# Patient Record
Sex: FEMALE | Race: BLACK OR AFRICAN AMERICAN | NOT HISPANIC OR LATINO
[De-identification: names, ages, dates, MRNs, and addresses within clinical notes are randomized per-mention and may not be internally consistent; named-entity substitution may affect disease eponyms.]

---

## 2018-08-28 ENCOUNTER — APPOINTMENT (OUTPATIENT)
Dept: ULTRASOUND IMAGING | Facility: IMAGING CENTER | Age: 63
End: 2018-08-28
Payer: COMMERCIAL

## 2018-08-28 ENCOUNTER — OUTPATIENT (OUTPATIENT)
Dept: OUTPATIENT SERVICES | Facility: HOSPITAL | Age: 63
LOS: 1 days | End: 2018-08-28
Payer: COMMERCIAL

## 2018-08-28 DIAGNOSIS — Z17.1 ESTROGEN RECEPTOR NEGATIVE STATUS [ER-]: ICD-10-CM

## 2018-08-28 DIAGNOSIS — C50.912 MALIGNANT NEOPLASM OF UNSPECIFIED SITE OF LEFT FEMALE BREAST: ICD-10-CM

## 2018-08-28 PROCEDURE — 76641 ULTRASOUND BREAST COMPLETE: CPT | Mod: 26,50

## 2018-08-28 PROCEDURE — 76641 ULTRASOUND BREAST COMPLETE: CPT

## 2018-09-13 ENCOUNTER — APPOINTMENT (OUTPATIENT)
Dept: OPHTHALMOLOGY | Facility: CLINIC | Age: 63
End: 2018-09-13
Payer: COMMERCIAL

## 2018-09-13 DIAGNOSIS — H40.039 ANATOMICAL NARROW ANGLE, UNSPECIFIED EYE: ICD-10-CM

## 2018-09-13 PROCEDURE — 92002 INTRM OPH EXAM NEW PATIENT: CPT

## 2018-09-13 PROCEDURE — 76513 OPH US DX ANT SGM US UNI/BI: CPT

## 2018-09-13 PROCEDURE — 92020 GONIOSCOPY: CPT

## 2019-11-04 ENCOUNTER — RESULT REVIEW (OUTPATIENT)
Age: 64
End: 2019-11-04

## 2020-09-11 VITALS
TEMPERATURE: 99 F | RESPIRATION RATE: 16 BRPM | SYSTOLIC BLOOD PRESSURE: 111 MMHG | HEART RATE: 78 BPM | DIASTOLIC BLOOD PRESSURE: 58 MMHG | OXYGEN SATURATION: 99 %

## 2020-09-11 PROCEDURE — 71046 X-RAY EXAM CHEST 2 VIEWS: CPT | Mod: 26

## 2020-09-11 RX ORDER — METOCLOPRAMIDE HCL 10 MG
10 TABLET ORAL ONCE
Refills: 0 | Status: COMPLETED | OUTPATIENT
Start: 2020-09-11 | End: 2020-09-11

## 2020-09-11 RX ORDER — SODIUM CHLORIDE 9 MG/ML
2000 INJECTION INTRAMUSCULAR; INTRAVENOUS; SUBCUTANEOUS ONCE
Refills: 0 | Status: COMPLETED | OUTPATIENT
Start: 2020-09-11 | End: 2020-09-11

## 2020-09-11 RX ADMIN — SODIUM CHLORIDE 1000 MILLILITER(S): 9 INJECTION INTRAMUSCULAR; INTRAVENOUS; SUBCUTANEOUS at 23:00

## 2020-09-11 NOTE — ED ADULT TRIAGE NOTE - CHIEF COMPLAINT QUOTE
pt c/o fevers, nausea and vomiting x2-3 days. Reports took Tylenol prior to ED arrival. Denies medical hx/medications. No distress noted.

## 2020-09-11 NOTE — ED PROVIDER NOTE - CLINICAL SUMMARY MEDICAL DECISION MAKING FREE TEXT BOX
Imelda Li MD: 64yo F with PMH of breast ca s/p lumpectomy, glaucoma who presents with fever, N/V for 3 days, back pain. Pt hemodynamically stable, afebrile. Non toxic appearing. Recently treated UTI. Possible pyelonephritis. Will check labs, UA/UC, IVF, anti-emetics. No advanced imaging needed at this time as pt is nontender. Imelda Li MD: 64yo F with PMH of breast ca s/p lumpectomy, glaucoma who presents with fever, N/V for 3 days, back pain. Pt hemodynamically stable, afebrile. Non toxic appearing. Recently treated UTI. Possible pyelonephritis. Will check labs, UA/UC, IVF, anti-emetics.   Vero BERRIOS: Agree with above. In addition, plan for CT A/P to r/o intra-abdominal pathology.

## 2020-09-11 NOTE — ED PROVIDER NOTE - ATTENDING CONTRIBUTION TO CARE
Patient is a 64 yo F with hx of breast cancer s/p lumpectomy, glaucoma here for evaluation of 3 days of fever associated with nausea and vomiting. She reports a UTI in early september and completed a 7 day course of Bactrim. Symptoms began after she completed her antibiotics. Denies cough, chest pain, shortness of breath. + headache. She is c/o low back pain. No dysuria, frequency. Denies flank pain. Tmax was 102.    VS noted  Gen. no acute distress, Non toxic   Spine: No midline C-T-L spine tenderness  HEENT: EOMI, mmm, pharynx w/o erythema, no nuchal rigitiy  Lungs: CTAB/L no C/ W /R   CVS: RRR   Abd; Soft non tender, non distended, no CVA tenderness  Ext: no edema  Skin: no rash  Neuro AAOx3 non focal clear speech  a/p: fever, nausea, vomiting - differential includes gastritis, pyelonephritis, viral syndrome less likely colitis. plan for cultures, labs, Abx, CXR, CT A/P and reassess.   - Vero BERRIOS

## 2020-09-11 NOTE — ED PROVIDER NOTE - NS ED ROS FT
General: +fever, chills  HENT: denies nasal congestion, sore throat, rhinorrhea  Eyes: denies vision changes  CV: denies chest pain  Resp: denies difficulty breathing, cough  Abdominal: +nausea, +vomiting, denies diarrhea, abdominal pain, blood in stool, dark stool  : denies pain with urination  MSK: denies recent trauma  Neuro: +headaches, denies numbness, tingling, dizziness, lightheadedness.  Skin: denies new rashes  Endocrine: denies recent weight loss

## 2020-09-11 NOTE — ED PROVIDER NOTE - OBJECTIVE STATEMENT
Imelda Li MD: 64yo F with PMH of breast ca s/p lumpectomy, glaucoma who presents with fever, N/V for 3 days. Pt states that she completed a 7 day course of Bactrim for UTI 3 days ago. Was started on Flagyl for bacterial vaginosis but has not been able to complete due to N/V. Pt has at least 2 episodes of NBNB emesis everyday. Tmax 102F. Admits to posterior throbbing headache, lower back pain since UTI. Denies dysuria or urinary frequency, abd pain, vaginal discharge or bleeding. Has not been able to tolerate any PO. Last took Tylenol at 3PM. No cough, SOB, CP, weakness, numbness, tingling.    PMD: Shalonda Soriano

## 2020-09-11 NOTE — ED PROVIDER NOTE - PHYSICAL EXAMINATION
CONSTITUTIONAL: Nontoxic, well nourished, well developed, elderly female, resting comfortably in no acute distress  HEAD: Normocephalic; atraumatic  EYES: Normal inspection, EOMI  ENMT: External appears normal; normal oropharynx, dry MM  NECK: Supple; non-tender; no cervical lymphadenopathy  CARD: RRR; no audible murmurs, rubs, or gallops  RESP: No respiratory distress, lungs ctab/l  ABD: Soft, non-distended; non-tender; no rebound or guarding  EXT: No LE pitting edema or calf tenderness; distal pulses intact with good capillary refill  SKIN: Warm, dry, intact  : no CVAT  NEURO: aaox3, moving all extremities spontaneously

## 2020-09-12 ENCOUNTER — INPATIENT (INPATIENT)
Facility: HOSPITAL | Age: 65
LOS: 0 days | Discharge: HOME | End: 2020-09-13
Attending: HOSPITALIST | Admitting: HOSPITALIST
Payer: COMMERCIAL

## 2020-09-12 DIAGNOSIS — Z29.9 ENCOUNTER FOR PROPHYLACTIC MEASURES, UNSPECIFIED: ICD-10-CM

## 2020-09-12 DIAGNOSIS — R51 HEADACHE: ICD-10-CM

## 2020-09-12 DIAGNOSIS — N76.0 ACUTE VAGINITIS: ICD-10-CM

## 2020-09-12 DIAGNOSIS — Z02.9 ENCOUNTER FOR ADMINISTRATIVE EXAMINATIONS, UNSPECIFIED: ICD-10-CM

## 2020-09-12 DIAGNOSIS — D72.825 BANDEMIA: ICD-10-CM

## 2020-09-12 DIAGNOSIS — H40.9 UNSPECIFIED GLAUCOMA: ICD-10-CM

## 2020-09-12 DIAGNOSIS — R74.8 ABNORMAL LEVELS OF OTHER SERUM ENZYMES: ICD-10-CM

## 2020-09-12 DIAGNOSIS — M54.9 DORSALGIA, UNSPECIFIED: ICD-10-CM

## 2020-09-12 DIAGNOSIS — Z98.890 OTHER SPECIFIED POSTPROCEDURAL STATES: Chronic | ICD-10-CM

## 2020-09-12 DIAGNOSIS — D72.810 LYMPHOCYTOPENIA: ICD-10-CM

## 2020-09-12 DIAGNOSIS — R11.2 NAUSEA WITH VOMITING, UNSPECIFIED: ICD-10-CM

## 2020-09-12 LAB
APPEARANCE UR: CLEAR — SIGNIFICANT CHANGE UP
BACTERIA # UR AUTO: NEGATIVE — SIGNIFICANT CHANGE UP
BILIRUB UR-MCNC: NEGATIVE — SIGNIFICANT CHANGE UP
BLOOD UR QL VISUAL: SIGNIFICANT CHANGE UP
COLOR SPEC: YELLOW — SIGNIFICANT CHANGE UP
GLUCOSE UR-MCNC: NEGATIVE — SIGNIFICANT CHANGE UP
HYALINE CASTS # UR AUTO: SIGNIFICANT CHANGE UP
KETONES UR-MCNC: SIGNIFICANT CHANGE UP
LEUKOCYTE ESTERASE UR-ACNC: NEGATIVE — SIGNIFICANT CHANGE UP
MUCOUS THREADS # UR AUTO: SIGNIFICANT CHANGE UP
NITRITE UR-MCNC: NEGATIVE — SIGNIFICANT CHANGE UP
PH UR: 7 — SIGNIFICANT CHANGE UP (ref 5–8)
PROT UR-MCNC: 300 — HIGH
RBC CASTS # UR COMP ASSIST: SIGNIFICANT CHANGE UP (ref 0–?)
SARS-COV-2 RNA SPEC QL NAA+PROBE: SIGNIFICANT CHANGE UP
SP GR SPEC: > 1.04 — HIGH (ref 1–1.04)
SQUAMOUS # UR AUTO: SIGNIFICANT CHANGE UP
UROBILINOGEN FLD QL: NORMAL — SIGNIFICANT CHANGE UP
WBC UR QL: HIGH (ref 0–?)

## 2020-09-12 PROCEDURE — 99285 EMERGENCY DEPT VISIT HI MDM: CPT

## 2020-09-12 PROCEDURE — 99223 1ST HOSP IP/OBS HIGH 75: CPT

## 2020-09-12 PROCEDURE — 74177 CT ABD & PELVIS W/CONTRAST: CPT | Mod: 26

## 2020-09-12 RX ORDER — DEXTROSE MONOHYDRATE, SODIUM CHLORIDE, AND POTASSIUM CHLORIDE 50; .745; 4.5 G/1000ML; G/1000ML; G/1000ML
1000 INJECTION, SOLUTION INTRAVENOUS
Refills: 0 | Status: DISCONTINUED | OUTPATIENT
Start: 2020-09-12 | End: 2020-09-12

## 2020-09-12 RX ORDER — VANCOMYCIN HCL 1 G
1000 VIAL (EA) INTRAVENOUS ONCE
Refills: 0 | Status: COMPLETED | OUTPATIENT
Start: 2020-09-12 | End: 2020-09-12

## 2020-09-12 RX ORDER — SODIUM CHLORIDE 9 MG/ML
2000 INJECTION INTRAMUSCULAR; INTRAVENOUS; SUBCUTANEOUS ONCE
Refills: 0 | Status: COMPLETED | OUTPATIENT
Start: 2020-09-11 | End: 2020-09-11

## 2020-09-12 RX ORDER — METOCLOPRAMIDE HCL 10 MG
10 TABLET ORAL ONCE
Refills: 0 | Status: DISCONTINUED | OUTPATIENT
Start: 2020-09-11 | End: 2020-09-12

## 2020-09-12 RX ORDER — INFLUENZA VIRUS VACCINE 15; 15; 15; 15 UG/.5ML; UG/.5ML; UG/.5ML; UG/.5ML
0.5 SUSPENSION INTRAMUSCULAR ONCE
Refills: 0 | Status: DISCONTINUED | OUTPATIENT
Start: 2020-09-12 | End: 2020-09-13

## 2020-09-12 RX ORDER — PIPERACILLIN AND TAZOBACTAM 4; .5 G/20ML; G/20ML
3.38 INJECTION, POWDER, LYOPHILIZED, FOR SOLUTION INTRAVENOUS ONCE
Refills: 0 | Status: DISCONTINUED | OUTPATIENT
Start: 2020-09-12 | End: 2020-09-12

## 2020-09-12 RX ORDER — METOCLOPRAMIDE HCL 10 MG
10 TABLET ORAL ONCE
Refills: 0 | Status: DISCONTINUED | OUTPATIENT
Start: 2020-09-11 | End: 2020-09-13

## 2020-09-12 RX ORDER — VANCOMYCIN HCL 1 G
1000 VIAL (EA) INTRAVENOUS ONCE
Refills: 0 | Status: DISCONTINUED | OUTPATIENT
Start: 2020-09-12 | End: 2020-09-12

## 2020-09-12 RX ORDER — PILOCARPINE HCL 4 %
1 DROPS OPHTHALMIC (EYE)
Refills: 0 | Status: DISCONTINUED | OUTPATIENT
Start: 2020-09-12 | End: 2020-09-13

## 2020-09-12 RX ORDER — ONDANSETRON 8 MG/1
4 TABLET, FILM COATED ORAL EVERY 6 HOURS
Refills: 0 | Status: DISCONTINUED | OUTPATIENT
Start: 2020-09-12 | End: 2020-09-12

## 2020-09-12 RX ORDER — PIPERACILLIN AND TAZOBACTAM 4; .5 G/20ML; G/20ML
3.38 INJECTION, POWDER, LYOPHILIZED, FOR SOLUTION INTRAVENOUS ONCE
Refills: 0 | Status: COMPLETED | OUTPATIENT
Start: 2020-09-12 | End: 2020-09-12

## 2020-09-12 RX ADMIN — Medication 250 MILLIGRAM(S): at 03:32

## 2020-09-12 RX ADMIN — Medication 1 DROP(S): at 17:41

## 2020-09-12 RX ADMIN — Medication 1 DROP(S): at 12:59

## 2020-09-12 RX ADMIN — Medication 10 MILLIGRAM(S): at 11:15

## 2020-09-12 RX ADMIN — PIPERACILLIN AND TAZOBACTAM 200 GRAM(S): 4; .5 INJECTION, POWDER, LYOPHILIZED, FOR SOLUTION INTRAVENOUS at 03:32

## 2020-09-12 NOTE — H&P ADULT - PROBLEM SELECTOR PLAN 1
4d N/V; NBNB. No abdominal pain; clinical exam and HPI suggestive of gastroenteritis  - IV reglan PRN for N/V  - Regular diet; pt tolerating PO intake   - f/u labs; recheck cbc/cmp daily

## 2020-09-12 NOTE — H&P ADULT - ATTENDING COMMENTS
65F with PMH significant for but not limited to breast cancer s/p lumpectomy in remission who is presenting to the hospital with nausea, vomiting and fevers x 3 days. She recently completed treatment for UTI. She was also diagnosed with BV, but was never able to take medication because she started to feel ill.     PHYSICAL EXAM:  Constitutional: NAD, awake and alert  ENT:  Normal Hearing, no tonsillar exudates   Neck: Soft and supple , no thyromegaly   Respiratory: Breath sounds are clear bilaterally, No wheezing, rales or rhonchi  Cardiovascular: S1 and S2, regular rate and rhythm, no Murmurs, gallops or rubs, no JVD,    Gastrointestinal: Soft, nontender, nondistended, no guarding, no rebound. +BS  Extremities: No lower extremity edema, no calf tenderness, warm to touch  Neurological: Alert and Oriented x3.  5/5 strength b/l upper and lower extremities.   Musculoskeletal: Normal ROM, no joint swelling.  Skin: No rashes, No erythema   Psych: normal mood, normal affect    Patient has been feeling better by this afternoon. Suspect that symptoms are from gastroenteritis, but more sinister pathology possible. Will need to monitor cultures and evaluate for fevers.   Continue on IV and monitor labs. 65F with PMH significant for but not limited to breast cancer s/p lumpectomy in remission who is presenting to the hospital with nausea, vomiting and fevers x 3 days. She recently completed treatment for UTI. She was also diagnosed with BV, but was never able to take medication because she started to feel ill.     PHYSICAL EXAM:  Constitutional: NAD, awake and alert  ENT:  Normal Hearing, no tonsillar exudates   Neck: Soft and supple , no thyromegaly   Respiratory: Breath sounds are clear bilaterally, No wheezing, rales or rhonchi  Cardiovascular: S1 and S2, regular rate and rhythm, no Murmurs, gallops or rubs, no JVD,    Gastrointestinal: Soft, nontender, nondistended, no guarding, no rebound. +BS  Extremities: No lower extremity edema, no calf tenderness, warm to touch  Neurological: Alert and Oriented x3.  5/5 strength b/l upper and lower extremities.   Musculoskeletal: Normal ROM, no joint swelling.  Skin: No rashes, No erythema   Psych: normal mood, normal affect    Patient has been feeling better by this afternoon. Suspect that symptoms are from gastroenteritis, but more sinister pathology possible. Will need to monitor cultures and evaluate for fevers.   CT shows no correlating pathology but does show. Hypoattenuating lesion in the right lobe of the liver.  Continue on IVF and monitor labs.

## 2020-09-12 NOTE — H&P ADULT - ASSESSMENT
65F w/pmhx of breast cancer, glaucoma, and recent UTI p/w 4d of nausea, vomiting, headache, and fever (tmax 102F). Labs reveal lymphopenia and bandemia. Symptoms largely resolved upon arrival to floor. 65F w/pmhx of breast cancer, glaucoma, and recent UTI (s/p bactrim) p/w 4d of nausea, vomiting, headache, and fever (tmax 102F). Labs reveal lymphopenia and bandemia. Symptoms largely resolved upon arrival to floor; now tolerating PO intake

## 2020-09-12 NOTE — H&P ADULT - NSHPLABSRESULTS_GEN_ALL_CORE
T(C): 37.2 (09-11-20 @ 20:51), Max: 37.2 (09-11-20 @ 20:51)  HR: 78 (09-11-20 @ 20:51) (78 - 78)  BP: 111/58 (09-11-20 @ 20:51) (111/58 - 111/58)  RR: 16 (09-11-20 @ 20:51) (16 - 16)  SpO2: 99% (09-11-20 @ 20:51) (99% - 99%)                          13.3   3.14  )-----------( 211      ( 11 Sep 2020 22:45 )             41.4     09-11    132<L>  |  97<L>  |  19  ----------------------------<  111<H>  4.6   |  22  |  1.30    Ca    8.2<L>      11 Sep 2020 22:45    TPro  7.3  /  Alb  3.8  /  TBili  0.3  /  DBili  x   /  AST  185<H>  /  ALT  126<H>  /  AlkPhos  103  09-11    CAPILLARY BLOOD GLUCOSE          Urinalysis Basic - ( 12 Sep 2020 02:07 )    Color: YELLOW / Appearance: CLEAR / SG: > 1.040 / pH: 7.0  Gluc: NEGATIVE / Ketone: SMALL  / Bili: NEGATIVE / Urobili: NORMAL   Blood: TRACE / Protein: 300 / Nitrite: NEGATIVE   Leuk Esterase: NEGATIVE / RBC: 3-5 / WBC 6-10   Sq Epi: FEW / Non Sq Epi: x / Bacteria: NEGATIVE        metoclopramide Injectable 10 milliGRAM(s) IV Push once  sodium chloride 0.9% Bolus 2000 milliLiter(s) IV Bolus once T(C): 37.2 (09-11-20 @ 20:51), Max: 37.2 (09-11-20 @ 20:51)  HR: 78 (09-11-20 @ 20:51) (78 - 78)  BP: 111/58 (09-11-20 @ 20:51) (111/58 - 111/58)  RR: 16 (09-11-20 @ 20:51) (16 - 16)  SpO2: 99% (09-11-20 @ 20:51) (99% - 99%)                          13.3   3.14  )-----------( 211      ( 11 Sep 2020 22:45 )             41.4     09-11    132<L>  |  97<L>  |  19  ----------------------------<  111<H>  4.6   |  22  |  1.30    Ca    8.2<L>      11 Sep 2020 22:45    TPro  7.3  /  Alb  3.8  /  TBili  0.3  /  DBili  x   /  AST  185<H>  /  ALT  126<H>  /  AlkPhos  103  09-11    CAPILLARY BLOOD GLUCOSE      Urinalysis Basic - ( 12 Sep 2020 02:07 )    Color: YELLOW / Appearance: CLEAR / SG: > 1.040 / pH: 7.0  Gluc: NEGATIVE / Ketone: SMALL  / Bili: NEGATIVE / Urobili: NORMAL   Blood: TRACE / Protein: 300 / Nitrite: NEGATIVE   Leuk Esterase: NEGATIVE / RBC: 3-5 / WBC 6-10   Sq Epi: FEW / Non Sq Epi: x / Bacteria: NEGATIVE    RADIOLOGY:    CT Abdomen/Pelvis (9/12)   IMPRESSION:   1. No explanation for lower back pain on this CT. Normal appendix.   2. New 1.0 x 1.5 cm indeterminate hypoattenuating lesion in the right lobe of the liver may be further evaluated with MRI or compared with a recent cross sectional study.     CXR (9/11)  IMPRESSION:   Clear lungs. No pleural effusions or pneumothorax.   Cardiac and mediastinal silhouettes within normal limits.   Trachea midline.   Unremarkable osseous structures.

## 2020-09-12 NOTE — DOWNTIME INTERRUPTION NOTE - WHICH MANUAL FORMS INITIATED?
Food and nutrition services diet order change form  Microbiology/virology laboratory sheet  General test requisition  Radiology report  stat-prn medication record  Jose Luis pressure ulcer assessment  Vital signs and pain management flowsheet

## 2020-09-12 NOTE — H&P ADULT - HISTORY OF PRESENT ILLNESS
65F w/pmhx of stage 0 breast cancer s/p lumpectomy and RT, glaucoma, and recent UTI p/w 4d of nausea, vomiting, headache, and fever (tmax 102F). Pt completed a course of bactrim on morning of 9/8 for UTI dx by her GYN the day before onset of symptoms. Pt also reports her GYN diagnosed her w/bacterial vaginitis and prescribed PO flagyl, but she has not been able to take the medication as vomiting began the day she received the flagyl rx. On the evening of 9/8 she noticed a severe occipital HA with throbbing quality and subsequently vomited shortly after onset of HA. She denies any neck stiffness, decreased ROM of the neck, photophobia, and phonophobia associated with her HA. The HA and N/V waxed and waned over the next several days until her daughter convinced her to seek medical care on evening of 9/11 because the pt has been intolerant of PO intake since onset of symptoms. Pt had intermittent fevers (100.5-102F) along with the HA and N/V that resolved with tylenol. The vomitus initially contained undigested food and has since been watery w/acid taste and dark brown color. Also endorses some lower back pain that arose when retching. Reports 2-3 episodes daily since 9/7, but has not vomited or had a recorded fever since presentation to the ED on 9/11. Denies abdominal pain, diarrhea, constipation, hematemesis, sick contacts, changes to diet, and public gatherings. COVID PCR negative in ED.     ED Course: Afebrile, mildly hypotensive (111/58), 98.9F. Recieved 2L NS, 10mg reglan, and vanc/zosyn x1. Lymphopenia on labs w/bandemia. CT abdomen and pelvis and CXR both negative for acute pathology. Admitted to medicine HS team 2 for further workup and management. Arrived on floor with complaints of fatigue, but resolution of her fever, N/V, and headache. Pt endorses significant hunger and requests normal diet. 65F w/pmhx of stage 0 breast cancer s/p lumpectomy and RT, glaucoma, and recent UTI p/w 4d of nausea, vomiting, headache, and fever (tmax 102F). Pt completed a course of bactrim on morning of 9/8 for UTI dx by her GYN the day before onset of symptoms. Pt also reports her GYN diagnosed her w/bacterial vaginitis and prescribed PO flagyl, but she has not been able to take the medication as vomiting began the day she received the flagyl rx. On the evening of 9/8 she noticed a severe occipital HA with throbbing quality and subsequently vomited shortly after onset of HA. She denies any neck stiffness, decreased ROM of the neck, photophobia, and phonophobia associated with her HA. The HA and N/V waxed and waned over the next several days until her daughter convinced her to seek medical care on evening of 9/11 because the pt has been intolerant of PO intake since onset of symptoms. Pt had intermittent fevers (100.5-102F) along with the HA and N/V that resolved with tylenol. The vomitus initially contained undigested food and has since been watery w/acid taste and dark brown color. Reports 2-3 episodes daily since 9/7, but has not vomited or had a recorded fever since presentation to the ED on 9/11. Denies abdominal pain, diarrhea, constipation, hematemesis, sick contacts, changes to diet, and public gatherings. COVID PCR negative in ED.     ED Course: Afebrile, mildly hypotensive (111/58), 98.9F. Recieved 2L NS, 10mg reglan, and vanc/zosyn x1. Lymphopenia on labs w/bandemia. CT abdomen and pelvis and CXR both negative for acute pathology. Admitted to medicine HS team 2 for further workup and management. Arrived on floor with complaints of fatigue, but resolution of her fever, N/V, and headache. Pt endorses significant hunger and requests normal diet.

## 2020-09-12 NOTE — H&P ADULT - NSHPREVIEWOFSYSTEMS_GEN_ALL_CORE
REVIEW OF SYSTEMS    General: +fatigue, denies fevers/chills/rigors since yesterday;   Skin/Breast: +hx of BCa; denies rashes, skin breakdown, easy bruising, and ulcers  Ophthalmologic: +glaucoma; denies photophobia, active eye pain, changes to vision  ENMT: Denies ear pain, congestion, oral sores, painful/difficult swallowing, sore throat  Respiratory and Thorax: Denies chest pain, dyspnea, tachypnea  Cardiovascular: Denies chest pain, palpitations, chest pressure  Gastrointestinal: +Recent N/V; now resolved. Denies abdominal pain, constipation, diarrhea, hematemesis   Genitourinary: +Recent UTI; denies dysuria, frequency, urgency, and hematuria  Musculoskeletal: Denies muscle/joint pain, joint swelling, LE edema  Neurological: +Recent HA; now resolved. Denies photophobia, neck pain/stiffness, balance issues, weakness, loss of sensation  Psychiatric: Denies anxiety, depression  Hematology/Lymphatics: Denies spontaneous bleeding, easy bruising, clotting difficulties  Endocrine: Denies heat/cold intolerance, hair loss  Allergic/Immunologic: Denies allergies to food or medication, allergic reactions, immune compromise, and autoimmune disease REVIEW OF SYSTEMS    General: +fatigue, denies fevers/chills/rigors since yesterday;   Skin/Breast: +hx of BCa; denies rashes, skin breakdown, easy bruising, and ulcers  Ophthalmologic: +glaucoma; denies photophobia, active eye pain, changes to vision  ENMT: Denies ear pain, congestion, oral sores, painful/difficult swallowing, sore throat  Respiratory and Thorax: Denies chest pain, dyspnea, tachypnea  Cardiovascular: Denies chest pain, palpitations, chest pressure  Gastrointestinal: +Recent N/V; now resolved. Denies abdominal pain, constipation, diarrhea, hematemesis   Genitourinary: +Recent UTI; denies dysuria, frequency, urgency, and hematuria  Musculoskeletal: Denies muscle/joint pain, joint swelling, LE edema; denies back pain  Neurological: +Recent HA; now resolved. Denies photophobia, neck pain/stiffness, balance issues, weakness, loss of sensation  Psychiatric: Denies anxiety, depression  Hematology/Lymphatics: Denies spontaneous bleeding, easy bruising, clotting difficulties  Endocrine: Denies heat/cold intolerance, hair loss  Allergic/Immunologic: Denies allergies to food or medication, allergic reactions, immune compromise, and autoimmune disease

## 2020-09-12 NOTE — H&P ADULT - PROBLEM SELECTOR PLAN 6
Dx of bacterial vaginitis by OP GYN this week; rx = metronidazole; no doses taken due to N/V this week  - Hold metronidazole 9/12 pending improvement of n/v

## 2020-09-12 NOTE — H&P ADULT - NSHPPHYSICALEXAM_GEN_ALL_CORE
PHYSICAL EXAM:    Constitutional: NAD; lying comfortably in bed  Eyes: 2mm pupils that are equal and reactive to light, EOMI  ENMT: No oral lesions, full ROM of neck  Respiratory: CTAB; no crackles, rales, or ronchi  Cardiovascular: Normal rate and rhythm, no MRG, S1/S2 appreciated  Gastrointestinal: Soft, non-distended; no tenderness to palpation; +BS x4  Extremities: Spontaneously moves all extremities; no LE edema  Vascular: 2+ pulses in BL LE; 2+ BL radial pulses; no evidence of venous stasis   Neurological: Non-focal; CN II-XII grossly intact; AAOx4  Skin: No rashes or lesions  Musculoskeletal: Normal muscle mass and tone; no erythema of joints; no joint swelling  Psychiatric: Appropriate mood and congruent, reactive affect

## 2020-09-12 NOTE — H&P ADULT - PROBLEM SELECTOR PLAN 4
AST/ALT = 185/126 on admission; Incidental hypoattenuating liver lesion discovered on CT. Denies hx of hepatitis and is fully vaccinated  - Trend CMP daily while admitted  - Encourage outpatient f/u of liver lesion

## 2020-09-12 NOTE — H&P ADULT - NSHPSOCIALHISTORY_GEN_ALL_CORE
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

## 2020-09-12 NOTE — H&P ADULT - NSICDXPASTMEDICALHX_GEN_ALL_CORE_FT
PAST MEDICAL HISTORY:  Breast CA Stage 0 s/p lumpectomy and RT    Glaucoma Managed w/opthalmic pilocarpine

## 2020-09-12 NOTE — H&P ADULT - PROBLEM SELECTOR PLAN 2
Occipital, throbbing HA during course of illness; worse when lying down  - Reported as resolved since this morning  - Consider PRN motrin vs. tylenol if HA recurs

## 2020-09-13 ENCOUNTER — TRANSCRIPTION ENCOUNTER (OUTPATIENT)
Age: 65
End: 2020-09-13

## 2020-09-13 VITALS
OXYGEN SATURATION: 100 % | SYSTOLIC BLOOD PRESSURE: 97 MMHG | HEART RATE: 69 BPM | DIASTOLIC BLOOD PRESSURE: 66 MMHG | TEMPERATURE: 98 F | RESPIRATION RATE: 17 BRPM

## 2020-09-13 LAB
CULTURE RESULTS: SIGNIFICANT CHANGE UP
HCV AB S/CO SERPL IA: 0.1 S/CO — SIGNIFICANT CHANGE UP (ref 0–0.99)
HCV AB SERPL-IMP: SIGNIFICANT CHANGE UP
SPECIMEN SOURCE: SIGNIFICANT CHANGE UP

## 2020-09-13 PROCEDURE — 99239 HOSP IP/OBS DSCHRG MGMT >30: CPT

## 2020-09-13 RX ORDER — ACETAMINOPHEN 500 MG
650 TABLET ORAL EVERY 6 HOURS
Refills: 0 | Status: DISCONTINUED | OUTPATIENT
Start: 2020-09-13 | End: 2020-09-13

## 2020-09-13 RX ORDER — METRONIDAZOLE 500 MG
500 TABLET ORAL ONCE
Refills: 0 | Status: COMPLETED | OUTPATIENT
Start: 2020-09-13 | End: 2020-09-13

## 2020-09-13 RX ADMIN — Medication 1 DROP(S): at 00:52

## 2020-09-13 RX ADMIN — Medication 500 MILLIGRAM(S): at 10:03

## 2020-09-13 RX ADMIN — Medication 650 MILLIGRAM(S): at 06:15

## 2020-09-13 RX ADMIN — Medication 650 MILLIGRAM(S): at 05:43

## 2020-09-13 RX ADMIN — Medication 1 DROP(S): at 05:43

## 2020-09-13 NOTE — PROGRESS NOTE ADULT - PROBLEM SELECTOR PLAN 2
Occipital, throbbing HA during course of illness; worse when lying down  - Reported as resolved since this morning  - Consider PRN motrin vs. tylenol if HA recurs  - D/C home

## 2020-09-13 NOTE — DISCHARGE NOTE PROVIDER - NSDCCPTREATMENT_GEN_ALL_CORE_FT
PRINCIPAL PROCEDURE  Procedure: CT abdomen  Findings and Treatment: You had a CT scan of your abdomen during the evaluation of your nausea and vomiting. A CT scan produces a 3d x-ray image to allow for evalution of internal organs and other structures of the body. Your abdominal CT scan did not identify a specific cause of your symptoms, which was one of several findings that led the medical team to believe the cause of your vomiting was gastroenteritis. The CT scan did find a small lesion in your liver. The exact cause and composition of the liver lesion cannot be completely assessed by CT scans, therefore we recommend you discuss the findng with your outpatient provider for further direction on monitoring and/or management of this incidental finding.

## 2020-09-13 NOTE — DISCHARGE NOTE PROVIDER - NSDCFUADDINST_GEN_ALL_CORE_FT
Follow up with your outpatient GYN within 1 week for further instructions on your antibiotic therapy and management of your bacterial vaginitis.     Discuss the incidentally discovered liver lesion with your PCP/GYN to arrange surveillance and management.

## 2020-09-13 NOTE — DISCHARGE NOTE PROVIDER - HOSPITAL COURSE
65F w/pmhx of stage 0 breast cancer s/p lumpectomy and RT, glaucoma, and recent UTI p/w 4d of nausea, vomiting, headache, and fever (tmax 102F). Pt completed a course of bactrim on morning of 9/8 for UTI dx by her GYN the day before onset of symptoms. Pt also reports her GYN diagnosed her w/bacterial vaginitis and prescribed PO flagyl, but she has not been able to take the medication as vomiting began the day she received the flagyl rx. On the evening of 9/8 she noticed a severe occipital HA with throbbing quality and subsequently vomited shortly after onset of HA. She denies any neck stiffness, decreased ROM of the neck, photophobia, and phonophobia associated with her HA. The HA and N/V waxed and waned over the next several days until her daughter convinced her to seek medical care on evening of 9/11 because the pt has been intolerant of PO intake since onset of symptoms. Pt had intermittent fevers (100.5-102F) along with the HA and N/V that resolved with tylenol. The vomitus initially contained undigested food and has since been watery w/acid taste and dark brown color. Reports 2-3 episodes daily since 9/7, but has not vomited or had a recorded fever since presentation to the ED on 9/11. Denies abdominal pain, diarrhea, constipation, hematemesis, sick contacts, changes to diet, and public gatherings. COVID PCR negative in ED.     ED Course: Afebrile, mildly hypotensive (111/58), 98.9F. Recieved 2L NS, 10mg reglan, and vanc/zosyn x1. Lymphopenia on labs w/bandemia. CT abdomen and pelvis and CXR both negative for acute pathology. Admitted to medicine HS team 2 for further workup and management. Arrived on floor with complaints of fatigue, but resolution of her fever, N/V, and headache. Pt endorses significant hunger and requests normal diet.      Admitted on 9/12; no episodes of vomiting and nausea resolved by time she arrived on the floor. Requested and supplied normal diet and has tolerated PO intake since. BCx negative to date; UCx grew normal kyra. CT negative for acute pathology; incidental finding of hypoattenuating liver lesion noted and should be followed as outpatient. Afebrile since ED arrival. Feels well and ready for d/c. Given flagyl PO x1 on day of d/c to replace dose she saw in her vomitus prior to arrival.

## 2020-09-13 NOTE — PROGRESS NOTE ADULT - PROBLEM SELECTOR PLAN 4
AST/ALT = 185/126 on admission; Incidental hypoattenuating liver lesion discovered on CT. Denies hx of hepatitis and is fully vaccinated  - Trend CMP daily while admitted  - Encourage outpatient f/u of liver lesion; discussed w/patient w/successful teachback

## 2020-09-13 NOTE — PROGRESS NOTE ADULT - PROBLEM SELECTOR PLAN 8
Dispo:   1.  Name of PCP: Ne Key (MD - Internal Medicine)  2.  PCP Contacted on Admission: [ ] Y    [X] N  Admitted weekend night  3.  PCP contacted at Discharge: [ ] Y    [X] N    [ ] N/A Discharged weekend  4.  Post-Discharge Appointment Date and Location: Yes  5.  Summary of Handoff given to PCP: No; discharged on weekend

## 2020-09-13 NOTE — DISCHARGE NOTE PROVIDER - NSDCCPCAREPLAN_GEN_ALL_CORE_FT
PRINCIPAL DISCHARGE DIAGNOSIS  Diagnosis: Gastroenteritis  Assessment and Plan of Treatment: Gastroenteritis is an infection in the gut that can cause any combination of nausea, vomiting, diarrhea, fever, and elevated white blood cell count. The description of your vomiting and fevers at home with rapid resolution in the hospital were consistent with a diagnosis of gastroenteritis; likely due to consumption of contaminated food products. You should continue to eat as tolerated and make sure to wash raw foods and salads prior to consumption. Avoid eating foods that have not been appropriately refrigerated. Cook meats thouroughly.   RETURN TO THE EMERGENCY DEPARTMENT IF: You have recurring nausea and/or vomiting, fevers, significant abdominal pain, altered mental status, loss of consciousness, or you are unable to tolerate oral intake.      SECONDARY DISCHARGE DIAGNOSES  Diagnosis: Headache  Assessment and Plan of Treatment: Headaches are a common medical problem that can arise for a variety of reasons. The description and timing of your headache in the setting of decreased oral intake, vomiting, and fever make the most likely cause a combination of dehydration and electrolyte imbalances. Your headaches continue to be an issue, but have largely improved since your admission and the administration of IV fluids.   RETURN TO THE EMERGENCY DEPARTMENT IF: You have recurring headaches that do not respond to over the counter medications such as tylenol or ibuprofen. You have significantly increased severity in the headaches, or you have headaches that coincide with fever, result in vomiting, or result in visual changes.

## 2020-09-13 NOTE — PROGRESS NOTE ADULT - PROBLEM SELECTOR PLAN 6
Dx of bacterial vaginitis by OP GYN this week; rx = metronidazole; no doses taken due to N/V this week  - Tolerated PO metronidazole 9/13; c/w abx following DC

## 2020-09-13 NOTE — DISCHARGE NOTE PROVIDER - PROVIDER TOKENS
PROVIDER:[TOKEN:[[id number]],ESTABLISHEDPATIENT:[T]] PROVIDER:[TOKEN:[[id number]],SCHEDULEDAPPT:[09/14/2020],ESTABLISHEDPATIENT:[T]]

## 2020-09-13 NOTE — DISCHARGE NOTE PROVIDER - NSDCMRMEDTOKEN_GEN_ALL_CORE_FT
metroNIDAZOLE 500 mg oral tablet: 1 tab(s) orally 2 times a day  pilocarpine 2% ophthalmic solution: 1 drop(s) to each affected eye 4 times a day

## 2020-09-13 NOTE — PROGRESS NOTE ADULT - PROBLEM SELECTOR PLAN 1
4d N/V; NBNB. No abdominal pain; clinical exam and HPI suggestive of gastroenteritis  - IV reglan PRN for N/V  - Regular diet; pt tolerating PO intake   - D/C home

## 2020-09-13 NOTE — DISCHARGE NOTE NURSING/CASE MANAGEMENT/SOCIAL WORK - PATIENT PORTAL LINK FT
You can access the FollowMyHealth Patient Portal offered by [facility] by registering at the following website: [email or web address]. By joining [facility]’s FollowMyHealth portal, you will also be able to view your health information using other applications (apps) compatible with our system.

## 2020-09-13 NOTE — PROGRESS NOTE ADULT - SUBJECTIVE AND OBJECTIVE BOX
PROGRESS NOTE:     CONTACT INFO:  Bryant Bates MD (Resident Physician - PGY-1 - Internal Medicine)  [email or web address]  Pager: [phone] (any site) or [phone] ([facility] only)    Patient is a 65y old  Female who presents with a chief complaint of Vomiting; fever (12 Sep 2020 10:20)      SUBJECTIVE / OVERNIGHT EVENTS:  No acute events overnight. Patient seen and evaluated at bedside. No fever/chills.  Denies SOB at rest, chest pain, palpitations, abdominal pain, nausea/vomiting    ADDITIONAL REVIEW OF SYSTEMS:    MEDICATIONS  (STANDING):  influenza   Vaccine 0.5 milliLiter(s) IntraMuscular once  metoclopramide Injectable 10 milliGRAM(s) IV Push once  metroNIDAZOLE    Tablet 500 milliGRAM(s) Oral once  pilocarpine 2% Solution 1 Drop(s) Both EYES four times a day    MEDICATIONS  (PRN):  acetaminophen   Tablet .. 650 milliGRAM(s) Oral every 6 hours PRN Mild Pain (1 - 3), Moderate Pain (4 - 6), Severe Pain (7 - 10)      CAPILLARY BLOOD GLUCOSE        I&O's Summary      PHYSICAL EXAM:  Vital Signs Last 24 Hrs  T(C): 36.9 (13 Sep 2020 05:38), Max: 37.1 (12 Sep 2020 21:56)  T(F): 98.4 (13 Sep 2020 05:38), Max: 98.7 (12 Sep 2020 21:56)  HR: 69 (13 Sep 2020 05:38) (69 - 74)  BP: 97/66 (13 Sep 2020 05:38) (97/66 - 102/66)  BP(mean): --  RR: 17 (13 Sep 2020 05:38) (17 - 17)  SpO2: 100% (13 Sep 2020 05:38) (100% - 100%)    CONSTITUTIONAL: NAD, well-developed  RESPIRATORY: Normal respiratory effort; lungs are clear to auscultation bilaterally  CARDIOVASCULAR: Regular rate and rhythm, normal S1 and S2, no murmur/rub/gallop; No lower extremity edema; Peripheral pulses are 2+ bilaterally  ABDOMEN: Nontender to palpation, normoactive bowel sounds, no rebound/guarding; No hepatosplenomegaly  MUSCLOSKELETAL: no clubbing or cyanosis of digits; no joint swelling or tenderness to palpation  PSYCH: A+O to person, place, and time; affect appropriate    LABS:                        13.3   3.14  )-----------( 211      ( 11 Sep 2020 22:45 )             41.4     09-11    132<L>  |  97<L>  |  19  ----------------------------<  111<H>  4.6   |  22  |  1.30    Ca    8.2<L>      11 Sep 2020 22:45    TPro  7.3  /  Alb  3.8  /  TBili  0.3  /  DBili  x   /  AST  185<H>  /  ALT  126<H>  /  AlkPhos  103  09-11          Urinalysis Basic - ( 12 Sep 2020 02:07 )    Color: YELLOW / Appearance: CLEAR / SG: > 1.040 / pH: 7.0  Gluc: NEGATIVE / Ketone: SMALL  / Bili: NEGATIVE / Urobili: NORMAL   Blood: TRACE / Protein: 300 / Nitrite: NEGATIVE   Leuk Esterase: NEGATIVE / RBC: 3-5 / WBC 6-10   Sq Epi: FEW / Non Sq Epi: x / Bacteria: NEGATIVE        Culture - Blood (collected 12 Sep 2020 06:58)  Source: .Blood Blood-Venous  Preliminary Report (13 Sep 2020 07:01):    No growth to date.    Culture - Blood (collected 12 Sep 2020 06:58)  Source: .Blood Blood-Peripheral  Preliminary Report (13 Sep 2020 07:01):    No growth to date.    Culture - Urine (collected 12 Sep 2020 02:21)  Source: .Urine Clean Catch (Midstream)  Final Report (13 Sep 2020 07:36):    <10,000 CFU/mL Normal Urogenital Ximena        RADIOLOGY & ADDITIONAL TESTS:  Results Reviewed:   Imaging Personally Reviewed:  Electrocardiogram Personally Reviewed:    COORDINATION OF CARE:  Care Discussed with Consultants/Other Providers [Y/N]:  Prior or Outpatient Records Reviewed [Y/N]:   PROGRESS NOTE:     CONTACT INFO:  Bryant Bates MD (Resident Physician - PGY-1 - Internal Medicine)  [email or web address]  Pager: [phone] (any site) or [phone] ([facility] only)    Patient is a 65y old  Female who presents with a chief complaint of Vomiting; fever (12 Sep 2020 10:20)      SUBJECTIVE / OVERNIGHT EVENTS:  Minor headache overnight that improved w/tylenol. HA reported as due to lost sleep from hearing roommate's IV alarm going off repeatedly. Patient seen and evaluated at bedside. No fever/chills. Denies SOB at rest, chest pain, palpitations, abdominal pain, nausea/vomiting    ADDITIONAL REVIEW OF SYSTEMS:    MEDICATIONS  (STANDING):  influenza   Vaccine 0.5 milliLiter(s) IntraMuscular once  metoclopramide Injectable 10 milliGRAM(s) IV Push once  metroNIDAZOLE    Tablet 500 milliGRAM(s) Oral once  pilocarpine 2% Solution 1 Drop(s) Both EYES four times a day    MEDICATIONS  (PRN):  acetaminophen   Tablet .. 650 milliGRAM(s) Oral every 6 hours PRN Mild Pain (1 - 3), Moderate Pain (4 - 6), Severe Pain (7 - 10)      CAPILLARY BLOOD GLUCOSE        I&O's Summary      PHYSICAL EXAM:  Vital Signs Last 24 Hrs  T(C): 36.9 (13 Sep 2020 05:38), Max: 37.1 (12 Sep 2020 21:56)  T(F): 98.4 (13 Sep 2020 05:38), Max: 98.7 (12 Sep 2020 21:56)  HR: 69 (13 Sep 2020 05:38) (69 - 74)  BP: 97/66 (13 Sep 2020 05:38) (97/66 - 102/66)  BP(mean): --  RR: 17 (13 Sep 2020 05:38) (17 - 17)  SpO2: 100% (13 Sep 2020 05:38) (100% - 100%)    Constitutional: NAD; lying comfortably in bed  Eyes: 2mm pupils that are equal and reactive to light, EOMI  Respiratory: CTAB; no crackles, rales, or ronchi  Cardiovascular: Normal rate and rhythm, no MRG, S1/S2 appreciated  Gastrointestinal: Soft, non-distended; no tenderness to palpation; +BS x4  Extremities: Spontaneously moves all extremities; no LE edema  Neurological: Non-focal; AAOx4  Psychiatric: Appropriate mood; congruent and reactive affect    LABS:                        13.3   3.14  )-----------( 211      ( 11 Sep 2020 22:45 )             41.4     09-11    132<L>  |  97<L>  |  19  ----------------------------<  111<H>  4.6   |  22  |  1.30    Ca    8.2<L>      11 Sep 2020 22:45    TPro  7.3  /  Alb  3.8  /  TBili  0.3  /  DBili  x   /  AST  185<H>  /  ALT  126<H>  /  AlkPhos  103  09-11          Urinalysis Basic - ( 12 Sep 2020 02:07 )    Color: YELLOW / Appearance: CLEAR / SG: > 1.040 / pH: 7.0  Gluc: NEGATIVE / Ketone: SMALL  / Bili: NEGATIVE / Urobili: NORMAL   Blood: TRACE / Protein: 300 / Nitrite: NEGATIVE   Leuk Esterase: NEGATIVE / RBC: 3-5 / WBC 6-10   Sq Epi: FEW / Non Sq Epi: x / Bacteria: NEGATIVE        Culture - Blood (collected 12 Sep 2020 06:58)  Source: .Blood Blood-Venous  Preliminary Report (13 Sep 2020 07:01):    No growth to date.    Culture - Blood (collected 12 Sep 2020 06:58)  Source: .Blood Blood-Peripheral  Preliminary Report (13 Sep 2020 07:01):    No growth to date.    Culture - Urine (collected 12 Sep 2020 02:21)  Source: .Urine Clean Catch (Midstream)  Final Report (13 Sep 2020 07:36):    <10,000 CFU/mL Normal Urogenital Ximena        RADIOLOGY & ADDITIONAL TESTS:  Results Reviewed: Y  Imaging Personally Reviewed: Y  Electrocardiogram Personally Reviewed:    CT Abdomen/Pelvis - 9/12  1. No explanation for lower back pain on this CT. Normal appendix.   2. New 1.0 x 1.5 cm indeterminate hypoattenuating lesion in the right lobe of the liver may be further evaluated with MRI or compared with a recent cross sectional study.       COORDINATION OF CARE:  Care Discussed with Consultants/Other Providers [Y/N]: Y  Prior or Outpatient Records Reviewed [Y/N]: Y

## 2020-09-13 NOTE — PROGRESS NOTE ADULT - ATTENDING COMMENTS
65F with PMH significant for but not limited to breast cancer s/p lumpectomy in remission who is presenting to the hospital with nausea, vomiting and fevers x 3 days. She recently completed treatment for UTI. She was also diagnosed with BV, but was never able to take medication because she started to feel ill.     Patient is feeling much improved today. Suspect that symptoms are from gastroenteritis. Cultures negative x2day, will need to be followed to completion.    CT shows no correlating pathology but does show hypoattenuating lesion in the right lobe of the liver. Patient was informed and will bring this up with her PCP who she will see tomorrow.

## 2020-09-13 NOTE — PROGRESS NOTE ADULT - PROBLEM SELECTOR PLAN 7
- No DVT prophylaxis at this time as pt is ambulatory and has no history of blood clots or hypercoagulable disorder   - Out of Bed as Tolerated  - Flu shot

## 2020-09-13 NOTE — DISCHARGE NOTE PROVIDER - CARE PROVIDER_API CALL
Shalonda Key  INTERNAL MEDICINE  [address] [place] [address]  Phone: [phone]  Fax: [phone]  Established Patient  Follow Up Time:    Shalonda Key  INTERNAL MEDICINE  [address] [place] [address]  Phone: [phone]  Fax: [phone]  Established Patient  Scheduled Appointment: 09/14/2020

## 2020-09-13 NOTE — DISCHARGE NOTE PROVIDER - INSTRUCTIONS
Continue to eat a balanced diet as tolerated. Take care to wash raw food products and cook all meats thoroughly prior to consumption.    Avoid alcohol during your course of outpatient antibiotics, as alcohol consumption while taking metronidazole can decrease the effectiveness of the antibiotic and result in unexpected levels of intoxication for the amount of alcohol consumed.

## 2020-09-13 NOTE — PROGRESS NOTE ADULT - ASSESSMENT
65F w/pmhx of breast cancer, glaucoma, and recent UTI (s/p bactrim) p/w 4d of nausea, vomiting, headache, and fever (tmax 102F). Labs reveal lymphopenia and bandemia. Symptoms largely resolved upon arrival to floor; now tolerating PO intake. Tolerated AM dose of flagyl (outpatient med for dx of bacterial vaginitis). Pt is clear for discharge today. 11-Mar-2017

## 2020-09-17 LAB
CULTURE RESULTS: SIGNIFICANT CHANGE UP
CULTURE RESULTS: SIGNIFICANT CHANGE UP
SPECIMEN SOURCE: SIGNIFICANT CHANGE UP
SPECIMEN SOURCE: SIGNIFICANT CHANGE UP

## 2022-05-10 NOTE — PATIENT PROFILE ADULT - NSPROEDALEARNPREFOTH_GEN_A_NUR
Lorazepam Approved      Filling Pharmacy: [id number] [place], [place]          verbal instruction

## 2022-05-20 ENCOUNTER — INPATIENT (INPATIENT)
Facility: HOSPITAL | Age: 67
LOS: 0 days | Discharge: HOME | DRG: 866 | End: 2022-05-21
Attending: HOSPITALIST | Admitting: HOSPITALIST
Payer: COMMERCIAL

## 2022-05-20 VITALS
OXYGEN SATURATION: 100 % | HEART RATE: 97 BPM | WEIGHT: 138.89 LBS | TEMPERATURE: 98 F | RESPIRATION RATE: 19 BRPM | HEIGHT: 63 IN | SYSTOLIC BLOOD PRESSURE: 122 MMHG | DIASTOLIC BLOOD PRESSURE: 86 MMHG

## 2022-05-20 DIAGNOSIS — Z98.890 OTHER SPECIFIED POSTPROCEDURAL STATES: Chronic | ICD-10-CM

## 2022-05-20 DIAGNOSIS — R51.9 HEADACHE, UNSPECIFIED: ICD-10-CM

## 2022-05-20 LAB
ALBUMIN SERPL ELPH-MCNC: 3.9 G/DL — SIGNIFICANT CHANGE UP (ref 3.3–5)
ALP SERPL-CCNC: 69 U/L — SIGNIFICANT CHANGE UP (ref 40–120)
ALT FLD-CCNC: 20 U/L — SIGNIFICANT CHANGE UP (ref 10–45)
ANION GAP SERPL CALC-SCNC: 11 MMOL/L — SIGNIFICANT CHANGE UP (ref 5–17)
AST SERPL-CCNC: 25 U/L — SIGNIFICANT CHANGE UP (ref 10–40)
BASOPHILS # BLD AUTO: 0.02 K/UL — SIGNIFICANT CHANGE UP (ref 0–0.2)
BASOPHILS NFR BLD AUTO: 0.4 % — SIGNIFICANT CHANGE UP (ref 0–2)
BILIRUB SERPL-MCNC: 0.2 MG/DL — SIGNIFICANT CHANGE UP (ref 0.2–1.2)
BUN SERPL-MCNC: 9 MG/DL — SIGNIFICANT CHANGE UP (ref 7–23)
CALCIUM SERPL-MCNC: 8.9 MG/DL — SIGNIFICANT CHANGE UP (ref 8.4–10.5)
CHLORIDE SERPL-SCNC: 103 MMOL/L — SIGNIFICANT CHANGE UP (ref 96–108)
CO2 SERPL-SCNC: 26 MMOL/L — SIGNIFICANT CHANGE UP (ref 22–31)
CREAT SERPL-MCNC: 0.96 MG/DL — SIGNIFICANT CHANGE UP (ref 0.5–1.3)
CRP SERPL-MCNC: 11 MG/L — HIGH (ref 0–4)
EGFR: 66 ML/MIN/1.73M2 — SIGNIFICANT CHANGE UP
EOSINOPHIL # BLD AUTO: 0 K/UL — SIGNIFICANT CHANGE UP (ref 0–0.5)
EOSINOPHIL NFR BLD AUTO: 0 % — SIGNIFICANT CHANGE UP (ref 0–6)
ERYTHROCYTE [SEDIMENTATION RATE] IN BLOOD: 34 MM/HR — HIGH (ref 0–20)
FLUAV H1 2009 PAND RNA SPEC QL NAA+PROBE: DETECTED
GLUCOSE SERPL-MCNC: 98 MG/DL — SIGNIFICANT CHANGE UP (ref 70–99)
HCT VFR BLD CALC: 38.9 % — SIGNIFICANT CHANGE UP (ref 34.5–45)
HGB BLD-MCNC: 12.4 G/DL — SIGNIFICANT CHANGE UP (ref 11.5–15.5)
IMM GRANULOCYTES NFR BLD AUTO: 0.2 % — SIGNIFICANT CHANGE UP (ref 0–1.5)
LYMPHOCYTES # BLD AUTO: 1.33 K/UL — SIGNIFICANT CHANGE UP (ref 1–3.3)
LYMPHOCYTES # BLD AUTO: 28.3 % — SIGNIFICANT CHANGE UP (ref 13–44)
MCHC RBC-ENTMCNC: 28.6 PG — SIGNIFICANT CHANGE UP (ref 27–34)
MCHC RBC-ENTMCNC: 31.9 GM/DL — LOW (ref 32–36)
MCV RBC AUTO: 89.8 FL — SIGNIFICANT CHANGE UP (ref 80–100)
MONOCYTES # BLD AUTO: 0.38 K/UL — SIGNIFICANT CHANGE UP (ref 0–0.9)
MONOCYTES NFR BLD AUTO: 8.1 % — SIGNIFICANT CHANGE UP (ref 2–14)
NEUTROPHILS # BLD AUTO: 2.96 K/UL — SIGNIFICANT CHANGE UP (ref 1.8–7.4)
NEUTROPHILS NFR BLD AUTO: 63 % — SIGNIFICANT CHANGE UP (ref 43–77)
NRBC # BLD: 0 /100 WBCS — SIGNIFICANT CHANGE UP (ref 0–0)
PLATELET # BLD AUTO: 202 K/UL — SIGNIFICANT CHANGE UP (ref 150–400)
POTASSIUM SERPL-MCNC: 3.4 MMOL/L — LOW (ref 3.5–5.3)
POTASSIUM SERPL-SCNC: 3.4 MMOL/L — LOW (ref 3.5–5.3)
PROT SERPL-MCNC: 7.2 G/DL — SIGNIFICANT CHANGE UP (ref 6–8.3)
RAPID RVP RESULT: DETECTED
RBC # BLD: 4.33 M/UL — SIGNIFICANT CHANGE UP (ref 3.8–5.2)
RBC # FLD: 13 % — SIGNIFICANT CHANGE UP (ref 10.3–14.5)
SARS-COV-2 RNA SPEC QL NAA+PROBE: SIGNIFICANT CHANGE UP
SODIUM SERPL-SCNC: 140 MMOL/L — SIGNIFICANT CHANGE UP (ref 135–145)
WBC # BLD: 4.7 K/UL — SIGNIFICANT CHANGE UP (ref 3.8–10.5)
WBC # FLD AUTO: 4.7 K/UL — SIGNIFICANT CHANGE UP (ref 3.8–10.5)

## 2022-05-20 PROCEDURE — 99223 1ST HOSP IP/OBS HIGH 75: CPT

## 2022-05-20 PROCEDURE — 99284 EMERGENCY DEPT VISIT MOD MDM: CPT

## 2022-05-20 RX ORDER — LANOLIN ALCOHOL/MO/W.PET/CERES
3 CREAM (GRAM) TOPICAL AT BEDTIME
Refills: 0 | Status: DISCONTINUED | OUTPATIENT
Start: 2022-05-20 | End: 2022-05-21

## 2022-05-20 RX ORDER — ACETAMINOPHEN 500 MG
650 TABLET ORAL EVERY 6 HOURS
Refills: 0 | Status: DISCONTINUED | OUTPATIENT
Start: 2022-05-20 | End: 2022-05-21

## 2022-05-20 RX ORDER — POTASSIUM CHLORIDE 20 MEQ
40 PACKET (EA) ORAL ONCE
Refills: 0 | Status: COMPLETED | OUTPATIENT
Start: 2022-05-20 | End: 2022-05-20

## 2022-05-20 RX ORDER — ONDANSETRON 8 MG/1
4 TABLET, FILM COATED ORAL EVERY 8 HOURS
Refills: 0 | Status: DISCONTINUED | OUTPATIENT
Start: 2022-05-20 | End: 2022-05-21

## 2022-05-20 NOTE — H&P ADULT - HISTORY OF PRESENT ILLNESS
66F PMH Breast Ca. in remission, p/w 2-day h/o  L. temporal pain. Describes pain as . She also reports URI sxs including, nasal/sinus congestion, post-nasal drip, and sneezing. Reports noticing bulging blood vessel in L. temporal area that pulsated. She took tylenol with pain relief. Pulsation recurred and pt decided to come to ED for evaluation.      ED course: Afebrile. HDS. Flu+   66F PMH  asx Sarcoidosis, Breast Ca. in remission, p/w 2-day h/o HA and temporal vessel bulge. She reports 2-day h/o a throbbing posteriorly located HA that started when she awoke from sleep and had noticed a pulsating, bulging blood vessel in her left temple. She took some Tylenol with relief of the HA but was concerned when she realized the bulging vessel was still there and decided to come in for evaluation. Of note, she has also had nasal/sinus congestion with a postnasal drip. Denied fevers/chill, temple tenderness, jaw claudication, vision changes, eye tearing, photophobia, n/v, neck stiffness.      ED course: Afebrile. HDS. Flu+

## 2022-05-20 NOTE — H&P ADULT - NSICDXFAMILYHX_GEN_ALL_CORE_FT
FAMILY HISTORY:  Mother  Still living? Unknown  Family history of Graves' disease, Age at diagnosis: Age Unknown

## 2022-05-20 NOTE — H&P ADULT - PROBLEM SELECTOR PLAN 1
-suspect related to flu and less likely temporal arteritis; exam with non-tender L. temple. no acute vision changes per pt  -Tylenol prn  -ESR and CRP mildly elevated(31 and   -will order US with color dopplers of head to evaluate for temporal arteritis  -will hold off on steroids for now  -can consider neuro eval in AM -suspect related to flu and less likely temporal arteritis; exam with non-tender L. temple. no acute vision changes per pt  -Tylenol prn  -ESR and CRP mildly elevated(31 and 11)  -will order US with color dopplers of head to evaluate for temporal arteritis  -will hold off on steroids for now however if c/o acute visual changes /temporal tenderness, please order IV steroids, page optho if acute vision changes  -can consider rheum eval in AM given background of sarcoidosis

## 2022-05-20 NOTE — ED PROVIDER NOTE - ATTENDING APP SHARED VISIT CONTRIBUTION OF CARE
Nemes - 67yo F hx of Breast CA in remission, glaucoma, HTN, presents to ER for left temporal pain x2days and headache.  Patient reports began having nasal congestion, sneezing, post nasal drip, sinus pressure.  Noticed bulging/pulsating and tender vessel to left temporal area, reports began feeling pulsation to area.  Took Tylenol and headache resolved.  However again the began feeling pulsation to area, which was again relieved by Tylenol.  patient originally thought bulging vessels would resolved but they have not prompting her to come to ER.  Denies fever, chills, CP, SOB, cough, visual changes, blurry vision, dizziness, throat pain, extremity numbness/tingling/weakness.  VS wnl, well appearing, in NAD. Moist mucosae, pink conjunctivae. Bulging/pulsating L temporal artery, TTP. Neck supple, FROM, CN/neuro intact. Lungs clear, cardiac wnl, no JVD. Abdomen soft/NT, no CVAT. No pedal edema, no calf TTP.   Likely temporal arteritis. Will get basic labs, CRP/ESR. If positive will get Vascular Sx/Neuro consult

## 2022-05-20 NOTE — ED PROVIDER NOTE - NS ED ATTENDING STATEMENT MOD
This was a shared visit with the BRAYAN. I reviewed and verified the documentation and independently performed the documented:

## 2022-05-20 NOTE — H&P ADULT - NSICDXPASTMEDICALHX_GEN_ALL_CORE_FT
PAST MEDICAL HISTORY:  Breast CA Stage 0 s/p lumpectomy and RT    History of narrow angle glaucoma     Sarcoidosis

## 2022-05-20 NOTE — H&P ADULT - PROBLEM SELECTOR PLAN 2
-pt with very mild sxs and just outside of window for tamiflu benefit  -will order flonase for nasal/sinus congestion

## 2022-05-20 NOTE — ED PROVIDER NOTE - CPE EDP EYES NORM
--IRON TIBC--- Message from Yovanny Swanson MD sent at 11/13/2020  7:16 AM CST -----  Results normal for this patient.  She can stop iron supplement at this point, repeat CBC IRON TIBC three months.       normal...

## 2022-05-20 NOTE — H&P ADULT - NSHPREVIEWOFSYSTEMS_GEN_ALL_CORE
GEN: no night sweats or change in appetite  EYES: no changes in vision or diplopia   ENT: no epistaxis, sinus pain, gingival bleeding,+post-nasal drip  CV: no CP, PND or palpitations  RESP: no cough, wheezing, or hemoptysis  GI: no hematemesis, hematochezia, or melena  : no dysuria, polyuria, or hematuria  MSK: no arthralgias or joint swelling   NEURO: no gross sensory changes, numbness, focal deficits  PSYCH: no depression or changes in concentration  HEME/ONC: no purpura, petechiae or night sweats  SKIN: no pruritus, hair loss or skin lesions  ALL: no photosensitivity, no complaints of anaphylaxis (SOB, throat swelling)

## 2022-05-20 NOTE — ED ADULT NURSE NOTE - OBJECTIVE STATEMENT
Patient presents to ED for diffuse headache since Wednesday, patient states earlier in the day she felt congested then felt the HA coming on. HA is controlled by Tylenol however patient came to ED for concern of "bump pulsating" on left temporal area that appeared with the headache. Denies any vision changes, b/l pupils 3mm equal and reactive. At present headache has subsided, denies dizziness, denies numbness/tingling to face. Denies n/v. Denies fevers/chills. Last dose of Tylenol taken last night.

## 2022-05-20 NOTE — H&P ADULT - NSHPLABSRESULTS_GEN_ALL_CORE
12.4   4.70  )-----------( 202      ( 20 May 2022 19:47 )             38.9       05-20    140  |  103  |  9   ----------------------------<  98  3.4<L>   |  26  |  0.96    Ca    8.9      20 May 2022 19:47    TPro  7.2  /  Alb  3.9  /  TBili  0.2  /  DBili  x   /  AST  25  /  ALT  20  /  AlkPhos  69  05-20            LIVER FUNCTIONS - ( 20 May 2022 19:47 )  Alb: 3.9 g/dL / Pro: 7.2 g/dL / ALK PHOS: 69 U/L / ALT: 20 U/L / AST: 25 U/L / GGT: x             I have personally reviewed imaging  No EKG in chart available for review

## 2022-05-20 NOTE — ED PROVIDER NOTE - CLINICAL SUMMARY MEDICAL DECISION MAKING FREE TEXT BOX
67yo F hx of Breast CA in remission presents to ER for left temporal pulsating pain as well as nasal congestion x2days.  Well appearing.  vitals stable. reassuring neuro exam.  No associated visual changes.  No current headache.  r/o temporal arteritis  -labs, ESR/CRP, RVP.  Patient declined pain medication at this time.

## 2022-05-20 NOTE — ED PROVIDER NOTE - OBJECTIVE STATEMENT
65yo F hx of Breast CA in remission presents to ER for left temporal pain 67yo F hx of Breast CA in remission presents to ER for left temporal pain x2days.  patient reports began having nasal congestion, sneezing, post nasal drip, sinus pressure.  Noticed bulging veins/artery to left temporal area, reports began feeling pulsation to area.   Took Tylenol and pain resolved.  However again the began feeling pulsation to area, which was again relieved by Tylenol.  patient originally thought bulging vessels would resolved but they have not prompting her to come to ER.  Denies fever, chills, CP, SOB, cough, visual changes, blurry vision, dizziness, throat pain, extremity numbness/tingling/weakness

## 2022-05-20 NOTE — H&P ADULT - NSHPPHYSICALEXAM_GEN_ALL_CORE
Vital Signs Last 24 Hrs  T(C): 36.8 (20 May 2022 23:53), Max: 37 (20 May 2022 18:57)  T(F): 98.3 (20 May 2022 23:53), Max: 98.6 (20 May 2022 18:57)  HR: 81 (20 May 2022 23:53) (80 - 97)  BP: 116/69 (20 May 2022 23:53) (111/71 - 122/86)  BP(mean): --  RR: 18 (20 May 2022 23:53) (16 - 19)  SpO2: 100% (20 May 2022 23:53) (98% - 100%)

## 2022-05-20 NOTE — H&P ADULT - ASSESSMENT
66F PMH  asx Sarcoidosis, Breast Ca. in remission, p/w 2-day h/o HA and temporal vessel bulge w/ c/f temporal arteritis. Found to have flu A.

## 2022-05-20 NOTE — ED PROVIDER NOTE - PROGRESS NOTE DETAILS
Nemes - core lab still haven't received ESR tube. Stated it takes 4-6 hrs for running the test once they have the sample. CDU full. Discuss w pt at length diff dg and high likelihood for temporal arteritis. Agree w admission. Accepted by Dr High to his service. PMD at [facility]

## 2022-05-20 NOTE — ED ADULT NURSE REASSESSMENT NOTE - NS ED NURSE REASSESS COMMENT FT1
per ARMEN jordan for patient to eat and drink at this time. patient provided with egg salad sandwich and ginger ale

## 2022-05-20 NOTE — ED ADULT NURSE NOTE - NSIMPLEMENTINTERV_GEN_ALL_ED
Implemented All Universal Safety Interventions:  [place] to call system. Call bell, personal items and telephone within reach. Instruct patient to call for assistance. Room bathroom lighting operational. Non-slip footwear when patient is off stretcher. Physically safe environment: no spills, clutter or unnecessary equipment. Stretcher in lowest position, wheels locked, appropriate side rails in place.

## 2022-05-20 NOTE — ED PROVIDER NOTE - ENMT, MLM
Airway patent, Nasal mucosa clear. Mouth with normal mucosa. Throat has no vesicles, no oropharyngeal exudates and uvula is midline.  left temporal area with pulsating vessels.  no tenderness

## 2022-05-21 ENCOUNTER — TRANSCRIPTION ENCOUNTER (OUTPATIENT)
Age: 67
End: 2022-05-21

## 2022-05-21 VITALS
RESPIRATION RATE: 18 BRPM | SYSTOLIC BLOOD PRESSURE: 118 MMHG | TEMPERATURE: 98 F | HEART RATE: 78 BPM | OXYGEN SATURATION: 100 % | DIASTOLIC BLOOD PRESSURE: 82 MMHG

## 2022-05-21 DIAGNOSIS — J10.1 INFLUENZA DUE TO OTHER IDENTIFIED INFLUENZA VIRUS WITH OTHER RESPIRATORY MANIFESTATIONS: ICD-10-CM

## 2022-05-21 DIAGNOSIS — Z29.9 ENCOUNTER FOR PROPHYLACTIC MEASURES, UNSPECIFIED: ICD-10-CM

## 2022-05-21 DIAGNOSIS — D86.9 SARCOIDOSIS, UNSPECIFIED: ICD-10-CM

## 2022-05-21 DIAGNOSIS — R51.9 HEADACHE, UNSPECIFIED: ICD-10-CM

## 2022-05-21 DIAGNOSIS — I10 ESSENTIAL (PRIMARY) HYPERTENSION: ICD-10-CM

## 2022-05-21 DIAGNOSIS — C50.919 MALIGNANT NEOPLASM OF UNSPECIFIED SITE OF UNSPECIFIED FEMALE BREAST: ICD-10-CM

## 2022-05-21 LAB
BUN SERPL-MCNC: 10 MG/DL — SIGNIFICANT CHANGE UP (ref 7–23)
CALCIUM SERPL-MCNC: 8.9 MG/DL — SIGNIFICANT CHANGE UP (ref 8.4–10.5)
CHLORIDE SERPL-SCNC: 106 MMOL/L — SIGNIFICANT CHANGE UP (ref 96–108)
CO2 SERPL-SCNC: 23 MMOL/L — SIGNIFICANT CHANGE UP (ref 22–31)
CREAT SERPL-MCNC: 0.98 MG/DL — SIGNIFICANT CHANGE UP (ref 0.5–1.3)
EGFR: 64 ML/MIN/1.73M2 — SIGNIFICANT CHANGE UP
ERYTHROCYTE [SEDIMENTATION RATE] IN BLOOD: 36 MM/HR — HIGH (ref 0–20)
GLUCOSE SERPL-MCNC: 79 MG/DL — SIGNIFICANT CHANGE UP (ref 70–99)
MAGNESIUM SERPL-MCNC: 2.1 MG/DL — SIGNIFICANT CHANGE UP (ref 1.6–2.6)
POTASSIUM SERPL-MCNC: 4.8 MMOL/L — SIGNIFICANT CHANGE UP (ref 3.5–5.3)
POTASSIUM SERPL-SCNC: 4.8 MMOL/L — SIGNIFICANT CHANGE UP (ref 3.5–5.3)
SODIUM SERPL-SCNC: 140 MMOL/L — SIGNIFICANT CHANGE UP (ref 135–145)

## 2022-05-21 PROCEDURE — 0225U NFCT DS DNA&RNA 21 SARSCOV2: CPT

## 2022-05-21 PROCEDURE — 99285 EMERGENCY DEPT VISIT HI MDM: CPT

## 2022-05-21 PROCEDURE — 36415 COLL VENOUS BLD VENIPUNCTURE: CPT

## 2022-05-21 PROCEDURE — 99223 1ST HOSP IP/OBS HIGH 75: CPT

## 2022-05-21 PROCEDURE — 80053 COMPREHEN METABOLIC PANEL: CPT

## 2022-05-21 PROCEDURE — 83735 ASSAY OF MAGNESIUM: CPT

## 2022-05-21 PROCEDURE — 85025 COMPLETE CBC W/AUTO DIFF WBC: CPT

## 2022-05-21 PROCEDURE — 96374 THER/PROPH/DIAG INJ IV PUSH: CPT

## 2022-05-21 PROCEDURE — 86140 C-REACTIVE PROTEIN: CPT

## 2022-05-21 PROCEDURE — 94640 AIRWAY INHALATION TREATMENT: CPT

## 2022-05-21 PROCEDURE — 99239 HOSP IP/OBS DSCHRG MGMT >30: CPT

## 2022-05-21 PROCEDURE — 85652 RBC SED RATE AUTOMATED: CPT

## 2022-05-21 PROCEDURE — 80048 BASIC METABOLIC PNL TOTAL CA: CPT

## 2022-05-21 RX ORDER — FLUTICASONE PROPIONATE 50 MCG
1 SPRAY, SUSPENSION NASAL
Refills: 0 | Status: DISCONTINUED | OUTPATIENT
Start: 2022-05-21 | End: 2022-05-21

## 2022-05-21 RX ORDER — PILOCARPINE HCL 4 %
1 DROPS OPHTHALMIC (EYE)
Qty: 0 | Refills: 0 | DISCHARGE

## 2022-05-21 RX ORDER — AMLODIPINE BESYLATE 2.5 MG/1
2.5 TABLET ORAL DAILY
Refills: 0 | Status: DISCONTINUED | OUTPATIENT
Start: 2022-05-21 | End: 2022-05-21

## 2022-05-21 RX ORDER — ENOXAPARIN SODIUM 100 MG/ML
40 INJECTION SUBCUTANEOUS EVERY 24 HOURS
Refills: 0 | Status: DISCONTINUED | OUTPATIENT
Start: 2022-05-21 | End: 2022-05-21

## 2022-05-21 RX ORDER — FLUTICASONE PROPIONATE 50 MCG
1 SPRAY, SUSPENSION NASAL
Qty: 0 | Refills: 0 | DISCHARGE
Start: 2022-05-21

## 2022-05-21 RX ORDER — AMLODIPINE BESYLATE 2.5 MG/1
1 TABLET ORAL
Qty: 0 | Refills: 0 | DISCHARGE

## 2022-05-21 RX ORDER — METRONIDAZOLE 500 MG
1 TABLET ORAL
Qty: 0 | Refills: 0 | DISCHARGE

## 2022-05-21 RX ADMIN — Medication 40 MILLIEQUIVALENT(S): at 01:13

## 2022-05-21 RX ADMIN — Medication 1 SPRAY(S): at 05:24

## 2022-05-21 RX ADMIN — AMLODIPINE BESYLATE 2.5 MILLIGRAM(S): 2.5 TABLET ORAL at 05:24

## 2022-05-21 RX ADMIN — Medication 75 MILLIGRAM(S): at 14:44

## 2022-05-21 NOTE — PROGRESS NOTE ADULT - SUBJECTIVE AND OBJECTIVE BOX
[facility] Division of Hospital Medicine  Pedro Hughes  Contact on MS TEAMS    Patient is a 66y old  Female who presents with a chief complaint of Headache (21 May 2022 17:10)      SUBJECTIVE / OVERNIGHT EVENTS: admitted overnight, seen in ED. Endorses pulsating at left side of head with appearance of blood vessel.     MEDICATIONS  (STANDING):  amLODIPine   Tablet 2.5 milliGRAM(s) Oral daily  enoxaparin Injectable 40 milliGRAM(s) SubCutaneous every 24 hours  fluticasone propionate 50 MICROgram(s)/spray Nasal Spray 1 Spray(s) Both Nostrils two times a day  oseltamivir 30 milliGRAM(s) Oral two times a day    MEDICATIONS  (PRN):  acetaminophen     Tablet .. 650 milliGRAM(s) Oral every 6 hours PRN Temp greater or equal to 38C (100.4F), Mild Pain (1 - 3)  aluminum hydroxide/magnesium hydroxide/simethicone Suspension 30 milliLiter(s) Oral every 4 hours PRN Dyspepsia  melatonin 3 milliGRAM(s) Oral at bedtime PRN Insomnia  ondansetron Injectable 4 milliGRAM(s) IV Push every 8 hours PRN Nausea and/or Vomiting      CAPILLARY BLOOD GLUCOSE        I&O's Summary      PHYSICAL EXAM:  Vital Signs Last 24 Hrs  T(C): 36.9 (21 May 2022 04:25), Max: 37 (20 May 2022 18:57)  T(F): 98.5 (21 May 2022 04:25), Max: 98.6 (20 May 2022 18:57)  HR: 78 (21 May 2022 04:25) (78 - 86)  BP: 118/82 (21 May 2022 04:25) (111/71 - 118/82)  BP(mean): --  RR: 18 (21 May 2022 04:25) (16 - 18)  SpO2: 100% (21 May 2022 04:25) (98% - 100%)  CONSTITUTIONAL: NAD, well-developed, well-groomed  RESPIRATORY: Normal respiratory effort; lungs are clear to auscultation bilaterally  CARDIOVASCULAR: Regular rate and rhythm, normal S1 and S2, no murmur/rub/gallop; No lower extremity edema; Peripheral pulses are 2+ bilaterally  ABDOMEN: Nontender to palpation, normoactive bowel sounds, no rebound/guarding  MUSCULOSKELETAL:  no joint swelling or tenderness to palpation  PSYCH: A+O to person, place, and time; affect appropriate  NEUROLOGY: no focal  deficits   SKIN: No rashes; no palpable lesions    LABS:                        12.4   4.70  )-----------( 202      ( 20 May 2022 19:47 )             38.9     05-21    140  |  106  |  10  ----------------------------<  79  4.8   |  23  |  0.98    Ca    8.9      21 May 2022 07:48  Mg     2.1     05-21    TPro  7.2  /  Alb  3.9  /  TBili  0.2  /  DBili  x   /  AST  25  /  ALT  20  /  AlkPhos  69  05-20                RADIOLOGY & ADDITIONAL TESTS:  Results Reviewed:   Imaging Personally Reviewed:  Electrocardiogram Personally Reviewed:    COORDINATION OF CARE:  Care Discussed with Consultants/Other Providers [Y/N]:  Prior or Outpatient Records Reviewed [Y/N]:

## 2022-05-21 NOTE — PROGRESS NOTE ADULT - PROBLEM SELECTOR PLAN 6
Dx of bacterial vaginitis by OP GYN this week; rx = metronidazole; no doses taken due to N/V this week  - Tolerated PO metronidazole 9/13; c/w abx following DC -Diet: DASH/TLC  -DVT ppx: Lovenox qd

## 2022-05-21 NOTE — DISCHARGE NOTE NURSING/CASE MANAGEMENT/SOCIAL WORK - PATIENT PORTAL LINK FT
You can access the FollowMyHealth Patient Portal offered by [facility] by registering at the following website: [email or web address]. By joining [employer]’s FollowMyHealth portal, you will also be able to view your health information using other applications (apps) compatible with our system.

## 2022-05-21 NOTE — PROGRESS NOTE ADULT - ASSESSMENT
65F w/pmhx of breast cancer, glaucoma, and recent UTI (s/p bactrim) p/w 4d of nausea, vomiting, headache, and fever (tmax 102F). Labs reveal lymphopenia and bandemia. Symptoms largely resolved upon arrival to floor; now tolerating PO intake. Tolerated AM dose of flagyl (outpatient med for dx of bacterial vaginitis). Pt is clear for discharge today.

## 2022-05-21 NOTE — PROGRESS NOTE ADULT - PROBLEM SELECTOR PLAN 4
AST/ALT = 185/126 on admission; Incidental hypoattenuating liver lesion discovered on CT. Denies hx of hepatitis and is fully vaccinated  - Trend CMP daily while admitted  - Encourage outpatient f/u of liver lesion; discussed w/patient w/successful teachback -in remission s/p L. breast lumpectomy and RT

## 2022-05-21 NOTE — PROGRESS NOTE ADULT - PROBLEM SELECTOR PLAN 5
Hx of angle closure glaucoma as outpatient; tx pilocarpine 2%   - c/w home medication (pilocarpine) -asx   -currently on no treatment -asx   -currently on no treatment  -Diet: DASH/TLC  -DVT ppx: Lovenox qd

## 2022-05-21 NOTE — DISCHARGE NOTE PROVIDER - NSDCCPCAREPLAN_GEN_ALL_CORE_FT
PRINCIPAL DISCHARGE DIAGNOSIS  Diagnosis: Headache  Assessment and Plan of Treatment: Improved- Follow up RA as instructed      SECONDARY DISCHARGE DIAGNOSES  Diagnosis: Influenza A  Assessment and Plan of Treatment: Complete Tamiflu as ordered  Increase fluid intake     PRINCIPAL DISCHARGE DIAGNOSIS  Diagnosis: Headache  Assessment and Plan of Treatment: Temporal vessel bulge and HA- concerning giant cell arteritis - ESR and CRP mildly elevated(31 and 11). Held off on steroids for now. Seen by rheum eval suspect related to flu and less likely temporal arteritis; exam with non-tender L. temple.      SECONDARY DISCHARGE DIAGNOSES  Diagnosis: Influenza A  Assessment and Plan of Treatment: Complete Tamiflu as ordered  Increase fluid intake

## 2022-05-21 NOTE — DISCHARGE NOTE PROVIDER - CARE PROVIDER_API CALL
Olinda Urena)  Internal Medicine; Rheumatology  [address] [place] [address]  Phone: [phone]  Fax: [phone]  Follow Up Time:

## 2022-05-21 NOTE — DISCHARGE NOTE PROVIDER - CARE PROVIDERS DIRECT ADDRESSES
[email or web address]

## 2022-05-21 NOTE — CONSULT NOTE ADULT - SUBJECTIVE AND OBJECTIVE BOX
JORGE CHEN  [id number]    HISTORY OF PRESENT ILLNESS:        PAST MEDICAL & SURGICAL HISTORY:  Breast CA  Stage 0 s/p lumpectomy and RT      History of narrow angle glaucoma      Sarcoidosis      S/P breast lumpectomy          Review of Systems:  Gen:  No fevers/chills, weight loss  HEENT: No blurry vision, no difficulty swallowing, no oral or nasal ulcers  CVS: No chest pain/palpitations  Resp: No SOB/wheezing  GI: No N/V/C/D/abdominal pain  MSK:  Skin: No new rashes  Neuro: No headaches    MEDICATIONS  (STANDING):  amLODIPine   Tablet 2.5 milliGRAM(s) Oral daily  enoxaparin Injectable 40 milliGRAM(s) SubCutaneous every 24 hours  fluticasone propionate 50 MICROgram(s)/spray Nasal Spray 1 Spray(s) Both Nostrils two times a day  oseltamivir 75 milliGRAM(s) Oral two times a day    MEDICATIONS  (PRN):  acetaminophen     Tablet .. 650 milliGRAM(s) Oral every 6 hours PRN Temp greater or equal to 38C (100.4F), Mild Pain (1 - 3)  aluminum hydroxide/magnesium hydroxide/simethicone Suspension 30 milliLiter(s) Oral every 4 hours PRN Dyspepsia  melatonin 3 milliGRAM(s) Oral at bedtime PRN Insomnia  ondansetron Injectable 4 milliGRAM(s) IV Push every 8 hours PRN Nausea and/or Vomiting      Allergies    No Known Allergies    Intolerances        PERTINENT MEDICATION HISTORY:    SOCIAL HISTORY:  OCCUPATION:  TRAVEL HISTORY:    FAMILY HISTORY:  Family history of Graves&#x27; disease (Mother)        Vital Signs Last 24 Hrs  T(C): 36.9 (21 May 2022 04:25), Max: 37 (20 May 2022 18:57)  T(F): 98.5 (21 May 2022 04:25), Max: 98.6 (20 May 2022 18:57)  HR: 78 (21 May 2022 04:25) (78 - 97)  BP: 118/82 (21 May 2022 04:25) (111/71 - 122/86)  BP(mean): --  RR: 18 (21 May 2022 04:25) (16 - 19)  SpO2: 100% (21 May 2022 04:25) (98% - 100%)    Physical Exam:  General: No apparent distress  HEENT: EOMI, MMM  CVS: +S1/S2, RRR, no murmurs/rubs/gallops  Resp: CTA b/l. No crackles/wheezing  GI: Soft, NT/ND +BS  MSK:  Neuro: AAOx3  Skin: no visible rashes    LABS:                        12.4   4.70  )-----------( 202      ( 20 May 2022 19:47 )             38.9     05-21    140  |  106  |  10  ----------------------------<  79  4.8   |  23  |  0.98    Ca    8.9      21 May 2022 07:48  Mg     2.1     05-21    TPro  7.2  /  Alb  3.9  /  TBili  0.2  /  DBili  x   /  AST  25  /  ALT  20  /  AlkPhos  69  05-20          RADIOLOGY & ADDITIONAL STUDIES:     JORGE CHEN  [id number]    HISTORY OF PRESENT ILLNESS:  66F PMH  asx Sarcoidosis, Breast Ca. in remission, p/w 2-day h/o HA and L. temporal vessel bulge. She reports 2-day h/o a throbbing posteriorly located HA that started when she awoke from sleep and had noticed a pulsating, bulging blood vessel in her left temple. She took some Tylenol with relief of the HA but was concerned when she realized the bulging vessel was still there and decided to come in for evaluation. Of note, she has also had nasal/sinus congestion with a postnasal drip. Denied fevers/chill, temple tenderness, jaw claudication, vision changes, eye tearing, photophobia, n/v, neck stiffness.  Afebrile. Flu+      Rheum hx:   sarcoidosis dx in 1990s, has been asymptomatic  for >20 years, not on steroid. CXR showed no abnormalities.   HA improved with Tylenol when seen   Denies vision changes, temporal tenderness, jaw pain/fatigue, should pain, hip pain, fever, chills, SOB.    neg MSK symptoms       PAST MEDICAL & SURGICAL HISTORY:  Breast CA  Stage 0 s/p lumpectomy and RT      History of narrow angle glaucoma      Sarcoidosis      S/P breast lumpectomy          Review of Systems:  Gen:  No fevers/chills, weight loss  HEENT: No blurry vision, no difficulty swallowing, no oral or nasal ulcers  CVS: No chest pain/palpitations  Resp: No SOB/wheezing  GI: No N/V/C/D/abdominal pain  MSK: See HPI    Skin: No new rashes  Neuro: No headaches    MEDICATIONS  (STANDING):  amLODIPine   Tablet 2.5 milliGRAM(s) Oral daily  enoxaparin Injectable 40 milliGRAM(s) SubCutaneous every 24 hours  fluticasone propionate 50 MICROgram(s)/spray Nasal Spray 1 Spray(s) Both Nostrils two times a day  oseltamivir 75 milliGRAM(s) Oral two times a day    MEDICATIONS  (PRN):  acetaminophen     Tablet .. 650 milliGRAM(s) Oral every 6 hours PRN Temp greater or equal to 38C (100.4F), Mild Pain (1 - 3)  aluminum hydroxide/magnesium hydroxide/simethicone Suspension 30 milliLiter(s) Oral every 4 hours PRN Dyspepsia  melatonin 3 milliGRAM(s) Oral at bedtime PRN Insomnia  ondansetron Injectable 4 milliGRAM(s) IV Push every 8 hours PRN Nausea and/or Vomiting      Allergies  No Known Allergies  Intolerances      PERTINENT MEDICATION HISTORY:    SOCIAL HISTORY: denies toxic habits     FAMILY HISTORY:  Family history of Graves&#x27; disease (Mother)        Vital Signs Last 24 Hrs  T(C): 36.9 (21 May 2022 04:25), Max: 37 (20 May 2022 18:57)  T(F): 98.5 (21 May 2022 04:25), Max: 98.6 (20 May 2022 18:57)  HR: 78 (21 May 2022 04:25) (78 - 97)  BP: 118/82 (21 May 2022 04:25) (111/71 - 122/86)  BP(mean): --  RR: 18 (21 May 2022 04:25) (16 - 19)  SpO2: 100% (21 May 2022 04:25) (98% - 100%)    Physical Exam:  General: NAD   HEENT: EOMI, MMM, + b/l temporal artery pulse equal   CVS: +S1/S2, RRR, no murmurs/rubs/gallops  Resp: CTA b/l. No crackles/wheezing  GI: Soft, NT/ND +BS  MSK: no synovitis, joints NTP    Neuro: AAOx3  Skin: no visible rashes    LABS:                        12.4   4.70  )-----------( 202      ( 20 May 2022 19:47 )             38.9     05-21    140  |  106  |  10  ----------------------------<  79  4.8   |  23  |  0.98    Ca    8.9      21 May 2022 07:48  Mg     2.1     05-21    TPro  7.2  /  Alb  3.9  /  TBili  0.2  /  DBili  x   /  AST  25  /  ALT  20  /  AlkPhos  69  05-20      RADIOLOGY & ADDITIONAL STUDIES:     JORGE CHEN  [id number]    HISTORY OF PRESENT ILLNESS:  66F PMH  asx Sarcoidosis, Breast Ca. in remission, p/w 2-day h/o HA and L. temporal vessel bulge. She reports 2-day h/o a throbbing posteriorly located HA that started when she awoke from sleep and had noticed a pulsating, bulging blood vessel in her left temple. She took some Tylenol with relief of the HA but was concerned when she realized the bulging vessel was still there and decided to come in for evaluation. Of note, she has also had nasal/sinus congestion with a postnasal drip. Denied fevers/chill, temple tenderness, jaw claudication, vision changes, eye tearing, photophobia, n/v, neck stiffness.  Afebrile. Flu+      Rheum hx:   sarcoidosis dx in 1990s, has been asymptomatic  for >20 years, not on steroid. CXR showed no abnormalities.   HA improved with Tylenol when seen   Denies vision changes, temporal tenderness, jaw pain/fatigue, should pain, hip pain, fever, chills, SOB.    neg MSK symptoms       PAST MEDICAL & SURGICAL HISTORY:  Breast CA  Stage 0 s/p lumpectomy and RT      History of narrow angle glaucoma      Sarcoidosis      S/P breast lumpectomy    Review of Systems:  Gen:  No fevers/chills, weight loss  HEENT: No blurry vision, no difficulty swallowing, no oral or nasal ulcers  CVS: No chest pain/palpitations  Resp: No SOB/wheezing  GI: No N/V/C/D/abdominal pain  MSK: See HPI    Skin: No new rashes  Neuro: No headaches    MEDICATIONS  (STANDING):  amLODIPine   Tablet 2.5 milliGRAM(s) Oral daily  enoxaparin Injectable 40 milliGRAM(s) SubCutaneous every 24 hours  fluticasone propionate 50 MICROgram(s)/spray Nasal Spray 1 Spray(s) Both Nostrils two times a day  oseltamivir 75 milliGRAM(s) Oral two times a day    MEDICATIONS  (PRN):  acetaminophen     Tablet .. 650 milliGRAM(s) Oral every 6 hours PRN Temp greater or equal to 38C (100.4F), Mild Pain (1 - 3)  aluminum hydroxide/magnesium hydroxide/simethicone Suspension 30 milliLiter(s) Oral every 4 hours PRN Dyspepsia  melatonin 3 milliGRAM(s) Oral at bedtime PRN Insomnia  ondansetron Injectable 4 milliGRAM(s) IV Push every 8 hours PRN Nausea and/or Vomiting      Allergies  No Known Allergies  Intolerances      PERTINENT MEDICATION HISTORY: per med rec   SOCIAL HISTORY: denies toxic habits     FAMILY HISTORY:  Family history of Graves&#x27; disease (Mother)      Vital Signs Last 24 Hrs  T(C): 36.9 (21 May 2022 04:25), Max: 37 (20 May 2022 18:57)  T(F): 98.5 (21 May 2022 04:25), Max: 98.6 (20 May 2022 18:57)  HR: 78 (21 May 2022 04:25) (78 - 97)  BP: 118/82 (21 May 2022 04:25) (111/71 - 122/86)  RR: 18 (21 May 2022 04:25) (16 - 19)  SpO2: 100% (21 May 2022 04:25) (98% - 100%)    Physical Exam:  General: NAD   HEENT: EOMI, MMM, + b/l temporal artery pulse equal and without induration (there is a prominent L sided vein), no scalp TTP or elicitable jaw claudication   CVS: +S1/S2, RRR, no murmurs/rubs/gallops  Resp: CTA b/l. No crackles/wheezing  GI: Soft, NT/ND +BS  MSK: no synovitis, joints NTP    Neuro: AAOx3  Skin: no visible rashes    LABS:                        12.4   4.70  )-----------( 202      ( 20 May 2022 19:47 )             38.9     05-21    140  |  106  |  10  ----------------------------<  79  4.8   |  23  |  0.98    Ca    8.9      21 May 2022 07:48  Mg     2.1     05-21    TPro  7.2  /  Alb  3.9  /  TBili  0.2  /  DBili  x   /  AST  25  /  ALT  20  /  AlkPhos  69  05-20      RADIOLOGY & ADDITIONAL STUDIES:

## 2022-05-21 NOTE — PATIENT PROFILE ADULT - NSPROPTRIGHTBILLOFRIGHTS_GEN_A_NUR
PAST SURGICAL HISTORY:  C Section 1994     Cataract extracted with lens implant 1998 Right    Cervical Spinal Stenosis surgery x2 (01/2002, 7/2002)     Cholecystectomy/appendectomy @ age 27     D&C 2008 hysteroscopy, endometrial hyperplasia, 2009    D&C x2 1980's     Endometrial biopsy 12/02/09     H/O colonoscopy 1998    H/O laser iridotomy left eye, 2016    S/P appendectomy     S/P cholecystectomy     S/P total abdominal hysterectomy and bilateral salpingo-oophorectomy     Tonsillectomy as a child     
patient

## 2022-05-21 NOTE — CONSULT NOTE ADULT - ATTENDING COMMENTS
On eval HA has now resolved as has prominence of vessel which appears to be a vein. No temporal artery induration, TTP or diminished pulses on exam, paucity of other GCA/PMR sx, ESR is age appropriate -- low suspicion for GCA, no further w/u or steroids recommended. No rheum contraindication to discharge.

## 2022-05-21 NOTE — PROGRESS NOTE ADULT - PROBLEM SELECTOR PLAN 1
4d N/V; NBNB. No abdominal pain; clinical exam and HPI suggestive of gastroenteritis  - IV reglan PRN for N/V  - Regular diet; pt tolerating PO intake   - D/C home -suspect related to flu and less likely temporal arteritis; exam with non-tender L. temple. no acute vision changes per pt  -Tylenol prn  -ESR and CRP mildly elevated(31 and 11)  -will hold off on steroids for now however if c/o acute visual changes /temporal tenderness, please order IV steroids, page optho if acute vision changes  -unable to receive US in AM; consulted rheum for further guidance- recs apprecaited - unlikely GCA  - will start janet flu for flu A and place to dc

## 2022-05-21 NOTE — PROGRESS NOTE ADULT - PROBLEM SELECTOR PLAN 2
Occipital, throbbing HA during course of illness; worse when lying down  - Reported as resolved since this morning  - Consider PRN motrin vs. tylenol if HA recurs  - D/C home -pt with very mild sxs and just outside of window for tamiflu benefit  -will order flonase for nasal/sinus congestion will start tamiflu due to risk of requiring hospitalization  renally dose

## 2022-05-21 NOTE — PATIENT PROFILE ADULT - FALL HARM RISK - HARM RISK INTERVENTIONS
Assistance OOB with selected safe patient handling equipment/Communicate Risk of Fall with Harm to all staff/Discuss with provider need for PT consult/Monitor gait and stability/Provide patient with walking aids - walker, cane, crutches/Reinforce activity limits and safety measures with patient and family/Tailored Fall Risk Interventions/Visual Cue: Yellow wristband and red socks/Bed in lowest position, wheels locked, appropriate side rails in place/Call bell, personal items and telephone in reach/Instruct patient to call for assistance before getting out of bed or chair/Non-slip footwear when patient is out of bed/[place] to call system/Physically safe environment - no spills, clutter or unnecessary equipment/Purposeful Proactive Rounding/Room/bathroom lighting operational, light cord in reach Communicate Risk of Fall with Harm to all staff/Reinforce activity limits and safety measures with patient and family/Tailored Fall Risk Interventions/Visual Cue: Yellow wristband and red socks/Bed in lowest position, wheels locked, appropriate side rails in place/Call bell, personal items and telephone in reach/Instruct patient to call for assistance before getting out of bed or chair/Non-slip footwear when patient is out of bed/[place] to call system/Physically safe environment - no spills, clutter or unnecessary equipment/Purposeful Proactive Rounding/Room/bathroom lighting operational, light cord in reach

## 2022-05-21 NOTE — CONSULT NOTE ADULT - ASSESSMENT
66F with hx of Sarcoidosis (asymptomatic for >20 years, not on meds), Breast Ca. in remission, p/w 2-day h/o HA and L. temporal vessel bulge. Rheumatology consulted to r/o GCA.     # r/o GCA   - adjusted for her age, pt's ESR is wnl (upper limit of normal for her would be 38)    - ESR and CRP can be elevated in the setting of infection   - temporal HA resolved, + b/l temporal pulse present and equal, no vision change, no jaw claudation/ fatigue, no MSK symptoms   - low suspicion for GCA  - steroid and US temporal artery not indicated    - no contraindication for discharge given HA resolved     Case discussed with attending       Zafar Buenrostro, PGY-4  Rheumatology Fellow   Pager: [phone]  please enter a full 10 digit number for call back   During weekends, please page on call fellow     66F with hx of Sarcoidosis (asymptomatic for >20 years, not on meds), Breast Ca. in remission, p/w 2-day h/o HA and L. temporal vessel bulge. Rheumatology consulted to r/o GCA.     # r/o GCA   - adjusted for her age, pt's ESR is wnl (upper limit of normal for her would be 38)    - ESR and CRP can be elevated in the setting of infection   - temporal HA resolved, + b/l temporal pulse present and equal, no vision change, no jaw claudation/ fatigue, no MSK symptoms   - low suspicion for GCA  - steroid and US temporal artery not indicated    - no contraindication for discharge given HA resolved     Case discussed with attending     Zafar Buenrostro, PGY-4  Rheumatology Fellow   Pager: [phone]  please enter a full 10 digit number for call back   During weekends, please page on call fellow

## 2022-05-21 NOTE — DISCHARGE NOTE PROVIDER - NSDCMRMEDTOKEN_GEN_ALL_CORE_FT
fluticasone 50 mcg/inh nasal spray: 1 spray(s) nasal 2 times a day  Norvasc 2.5 mg oral tablet: 1 tab(s) orally once a day  oseltamivir 30 mg oral capsule: 1 cap(s) orally 2 times a day

## 2022-05-21 NOTE — DISCHARGE NOTE PROVIDER - HOSPITAL COURSE
66F PMH Sarcoidosis, Breast Ca. in remission, p/w 2-day h/o HA and temporal vessel bulge. She reports 2-day h/o a throbbing posteriorly located HA that started when she awoke from sleep and had noticed a pulsating, bulging blood vessel in her left temple. ESR and CRP mildly elevated(31 and 11). Held off on steroids for now. Seen by rheum eval suspect related to flu and less likely temporal arteritis; exam with non-tender L. temple. no acute vision changes per pt  Problem: Influenza A. - pt with very mild symptoms Tamiflu initiated Flonase for nasal/sinus congestion.   HTN (hypertension).    Continue norvasc 2.5mg qd.     Breast cancer.- in remission s/p L. breast lumpectomy and RT.     Sarcoidosis. -currently on no treatment.    Follow up with Rheumatology as needed      66F PMH Sarcoidosis, Breast Ca. in remission, p/w 2-day h/o HA and temporal vessel bulge. She reports 2-day h/o a throbbing posteriorly located HA that started when she awoke from sleep and had noticed a pulsating, bulging blood vessel in her left temple.   Temporal vessel bulge and HA- concerning giant cell arteritis - ESR and CRP mildly elevated(31 and 11). Held off on steroids for now. Seen by rheum eval suspect related to flu and less likely temporal arteritis; exam with non-tender L. temple.     for Influenza A. - pt with very mild symptoms Tamiflu initiated Flonase for nasal/sinus congestion.   HTN (hypertension).    Continue norvasc 2.5mg qd.     Breast cancer.- in remission s/p L. breast lumpectomy and RT.     Sarcoidosis. -currently on no treatment.    Follow up with Rheumatology as needed

## 2022-06-06 NOTE — PATIENT PROFILE ADULT - STATED REASON FOR ADMISSION
Pt's wife called and asked if Dr Deandre Pedro could call her back to go over labs done on 06/03   Please call pt at [phone] nausea/vomiting

## 2022-06-09 PROBLEM — D86.9 SARCOIDOSIS, UNSPECIFIED: Chronic | Status: ACTIVE | Noted: 2022-05-21

## 2022-06-09 PROBLEM — Z86.69 PERSONAL HISTORY OF OTHER DISEASES OF THE NERVOUS SYSTEM AND SENSE ORGANS: Chronic | Status: ACTIVE | Noted: 2022-05-21

## 2022-07-18 ENCOUNTER — APPOINTMENT (OUTPATIENT)
Dept: VASCULAR SURGERY | Facility: CLINIC | Age: 67
End: 2022-07-18

## 2022-08-15 ENCOUNTER — NON-APPOINTMENT (OUTPATIENT)
Age: 67
End: 2022-08-15

## 2022-08-15 ENCOUNTER — APPOINTMENT (OUTPATIENT)
Dept: SPINE | Facility: CLINIC | Age: 67
End: 2022-08-15

## 2022-08-23 NOTE — ASSESSMENT
[FreeTextEntry1] : IMPRESSION:\par 67F with PMH of.... p/w.... MRI 6/24, MRA 6/10 done at [facility] [place] reveals....\par \par JORGE CHEN is a 67 year old female with PMH of……\par underwent MRI brain w/wo 6/24, and MRA brain w/wo 6/10 at [facility] [place] which shows...\par MRA from [facility] [place] 2mm aneurysm\par MRI brain w/wo IV contrast – 6/24/22- [facility]- PACS\par MRA brain w/wo IV contrast- 6/10/22- [facility]- PACS\par \par \par \par \par PLAN:

## 2022-08-23 NOTE — DATA REVIEWED
[de-identified] : [facility]de[facility]identified[facility] : [facility]

## 2022-08-23 NOTE — HISTORY OF PRESENT ILLNESS
[de-identified] : JORGE CHEN is a 67 year old female with PMH of……\par underwent MRI brain w/wo 6/24, and MRA brain w/wo 6/10 at [facility] which shows...\par MRA from [facility]- 2mm aneurysm\par MRI brain w/wo IV contrast – 6/24/22- [facility]- PACS\par MRA brain w/wo IV contrast- 6/10/22- [facility]- PACS\par

## 2022-08-24 ENCOUNTER — APPOINTMENT (OUTPATIENT)
Dept: NEUROSURGERY | Facility: CLINIC | Age: 67
End: 2022-08-24

## 2022-08-24 VITALS
WEIGHT: 139 LBS | DIASTOLIC BLOOD PRESSURE: 68 MMHG | OXYGEN SATURATION: 100 % | BODY MASS INDEX: 24.63 KG/M2 | HEART RATE: 85 BPM | SYSTOLIC BLOOD PRESSURE: 102 MMHG | HEIGHT: 63 IN

## 2022-08-24 DIAGNOSIS — Z78.9 OTHER SPECIFIED HEALTH STATUS: ICD-10-CM

## 2022-08-24 PROCEDURE — 99203 OFFICE O/P NEW LOW 30 MIN: CPT

## 2022-08-24 RX ORDER — AMLODIPINE BESYLATE 2.5 MG/1
2.5 TABLET ORAL
Refills: 0 | Status: ACTIVE | COMMUNITY

## 2022-08-25 NOTE — HISTORY OF PRESENT ILLNESS
[de-identified] : JORGE CHEN is a 67 year old female with PMH of sarcoidosis, narrow angle glaucoma, breast cancer in remission, presented to [facility] ED on 5/20/22 with reports of left temporal artery "bulging" with no associated pain or headache at the time. She states she just had visually noticed a pulsating, bulging blood vessel in her left temple. Of note, she was positive for the Flu at time of admission. Per patient, she was never diagnosed with HTN but her PCP, Dr. Shalonda Key prescribed her Norvasc 2.5mg which she irregularly takes every once in a while. Denies known family history of neurologic problems or aneurysms. Upon discharged from ED she was instructed to follow up with rheumatologist and neurologist. She was evaluated and was told she does not have giant cell arteritis. 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 She has mild occasional headaches that have been chronic and are not bothersome for her. Denies other neurologic symptoms of motor, sensory, speech, or visual abnormalities.

## 2022-08-25 NOTE — DATA REVIEWED
[de-identified] : [facility]wo IV [facility] [de[facility] : [facility]

## 2022-08-25 NOTE — ASSESSMENT
[FreeTextEntry1] : IMPRESSION:\par 67F with PMH of sarcoidosis, narrow angle glaucoma, breast CA in remission, p/w L temporal artery "bulging" 5/20, concern for giant cell arteritis. Followed up outpatient with rheumatologist and neurologist, not deemed to have giant cell arteritis after evaluation. 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 Denies known family history of aneurysms.\par \par She reports chronic mild occasional headaches. Denies other motor, sensory, speech, or visual abnormalities. \par \par Counseled patient on the natural history of aneurysms, discussed risk of aneurysm rupture and risk factors for aneurysm rupture with the patient. These risk factors include extensive family history of cerebral aneurysms, which she denies, smoking which she states she has never been a smoker, and uncontrolled high blood pressure which she states her BP has been managed well. The risk of aneurysm rupture is also related to the size. Due to the small size of her aneurysm, it has very low risk of rupturing.\par \par Recommend conservative management with serial scans. If growth seen on interval scans, discussed possible aneurysm treatment options including minimally invasive endovascular intervention with embolization, or surgically open clipping of aneurysm. \par \par Educated the patient on signs and symptoms of aneurysm rupture or subarachnoid hemorrhage. Should she have severe, sudden onset worst headache of her life, advised that she must seek medical attention immediately and go to the emergency room if this occurs. \par \par \par PLAN:\par Repeat MRA brain non con 6 months after previous imaging - December 2022\par Follow up after to discuss results\par

## 2022-09-04 NOTE — PROGRESS NOTE ADULT - PROBLEM/PLAN-5
DISPLAY PLAN FREE TEXT Hospitalist Progress Note    Subjective:  Patient was seen and examined this morning.  Still requiring 5 L nasal cannula, denied any shortness of breath or chest pain      Objective:  Vitals with min/max:      Vital Last Value 24 Hour Range   Temperature 98.1 °F (36.7 °C) (09/04/22 1033) Temp  Min: 98.1 °F (36.7 °C)  Max: 99 °F (37.2 °C)   Pulse 99 (09/04/22 1033) Pulse  Min: 86  Max: 99   Respiratory 16 (09/04/22 1033) Resp  Min: 14  Max: 24   Non-Invasive  Blood Pressure 106/65 (09/04/22 1033) BP  Min: 95/56  Max: 136/61   Pulse Oximetry 94 % (09/04/22 1033) SpO2  Min: 92 %  Max: 98 %   Arterial   Blood Pressure   No data recorded     09/03 0700 - 09/04 0659  In: 1171.1 [P.O.:540]  Out: 700 [Urine:700]      Physical Exam:  Gen alert, nad  Eyes anicteric, EOMI  ENT MMM, OP clear  cor rrr, no g/r  lungs decreased air entry on the right side.  Bilateral expiratory wheezing  abd nt, nd, soft, +bs  skin no rash on UE, no pallor  MSK normal muscle bulk, no joint swelling  Neuro answers questions appropriately, no tremors  Psych calm, good eye contact        Labs:  Recent Labs   Lab 09/04/22 0514 09/03/22 0324 09/02/22 1948   WBC 9.4 13.8* 15.5*   HGB 12.3 11.3* 11.2*    290 324     Recent Labs   Lab 09/04/22 0514 09/03/22 0324 09/02/22 1948   SODIUM 139 142 138   POTASSIUM 3.5 3.9 4.1   CHLORIDE 106 108 106   CO2 23 22 24   BUN 9 11 13   CREATININE 0.72 0.66 0.70   GLUCOSE 111* 116* 131*   CALCIUM 7.5* 7.6* 7.7*     Recent Labs   Lab 09/04/22 0514 09/03/22 0324   AST 14 16   GPT 10 13   ALKPT 45 51   BILIRUBIN 0.5 0.5   ALBUMIN 1.8* 2.1*       No results found     No results found  No results found  Cholesterol (mg/dL)   Date Value   04/18/2022 238 (H)      HDL (mg/dL)   Date Value   04/18/2022 43 (L)      Cholesterol/ HDL Ratio (no units)   Date Value   04/18/2022 5.5 (H)      Triglycerides (mg/dL)   Date Value   04/18/2022 222 (H)      LDL (mg/dL)   Date Value   04/18/2022 151 (H)        Microbiology  Results  (Last 10 results in the past 7 days)    Specimen   Gram Smear   Culture Result   Status       09/02/22  1651         Many Polymorphonuclear cells.  [P]            Few Epithelial cells.  [P]            Few Gram positive cocci.  [P]                 [P] - Preliminary Result             No specimens collected during this procedure.     Other results:  XR CHEST PA AND LATERAL 2 VIEWS    Result Date: 9/3/2022  EXAM: XR CHEST PA AND LATERAL 2 VIEWS CLINICAL INDICATION: Pulmonary infiltrates. COMPARISON: 09/02/2022, August 8 two, CT chest PA protocol 09/03/2022.     FINDINGS AND IMPRESSION: Large opacity right lower lung zone and blunting of the right costophrenic angle appear less pronounced since the day before, likely due to the fact that current study was performed in upright position.  This is consistent with moderate pleural effusion and underlying consolidation of the right lower and likely right middle lobe.  Left lung is well expanded and appears clear. Left costophrenic angle remains sharp.. There is no evidence of pneumothorax.  Trachea is midline. Cardiomediastinal silhouette is not enlarged. Visualized osseous structures appear unremarkable. Electronically Signed by: SUDHAKAR GUNTER M.D. Signed on: 9/3/2022 5:17 PM     CTA CHEST PULMONARY EMBOLISM W CONTRAST    Result Date: 9/3/2022  EXAM: CTA CHEST PULMONARY EMBOLISM W CONTRAST CLINICAL INDICATION: Shortness of breath CT chest dated 622 COMPARISON: None. TECHNIQUE: Contiguous 1.25 mm axial images are obtained from the thoracic inlet through the adrenal glands following the administration of intravenous contrast. The dose and type of IV contrast utilized for this examination are recorded in the imaging encounter of the patient's medical record. Sagittal and coronal reconstructions are evaluated. Automated exposure control utilized.  3D maximum intensity reconstructions were generated. FINDINGS: HEART/VESSELS:  Normal heart size without pericardial  fluid.  Normal caliber thoracic aorta.  No intraluminal filling defects in the pulmonary arteries.  There is reflux of contrast into the IVC and hepatic veins. MEDIASTINUM/LYMPH NODES: Redemonstrated mediastinal lymphadenopathy.  No axillary lymphadenopathy. LUNGS:  New moderate size right pleural effusion.  There is debris in the right mainstem bronchus resulting in near complete obstruction/impaction, the distal right mainstem bronchus and right lower lobe airways.Large area of mildly enhancing consolidation in the right lower lobe and part of right middle lobe with internal arteriograms.  This is highly suggestive of pneumonia.  BONES/BODY WALL:  No suspicious lesions. UPPER ABDOMEN:  Stable 5.1 cm left hepatic cyst.     1.    No pulmonary embolism. 2.   New moderate volume right pleural effusion. 3.    Large area of mildly enhancing consolidation in the right lower lobe and part of right middle lobe with internal arteriograms.  This is highly suggestive of pneumonia.  Minimal dependent atelectasis left lung base. 4.   Stable mediastinal lymphadenopathy. 5.   Reflux of contrast into the IVC and hepatic veins, which can be seen in the setting of right heart dysfunction. Dictated by: ASIF MILLER Dictated on: 9/3/2022 5:06 PM ISUDHAKAR M.D., have reviewed the images and report and concur with these findings interpreted by ASIF MILLER. Electronically Signed by: SUDHAKAR GUNTER M.D. Signed on: 9/3/2022 5:41 PM               XR CHEST PA AND LATERAL 2 VIEWS   Final Result   FINDINGS AND IMPRESSION: Large opacity right lower lung zone and blunting   of the right costophrenic angle appear less pronounced since the day   before, likely due to the fact that current study was performed in upright   position.  This is consistent with moderate pleural effusion and underlying   consolidation of the right lower and likely right middle lobe.    Left lung is well expanded and appears clear. Left costophrenic angle    remains sharp.. There is no evidence of pneumothorax.  Trachea is midline.   Cardiomediastinal silhouette is not enlarged. Visualized osseous structures   appear unremarkable.      Electronically Signed by: SUDHAKAR GUNTER M.D.    Signed on: 9/3/2022 5:17 PM          CTA CHEST PULMONARY EMBOLISM W CONTRAST   Final Result   1.    No pulmonary embolism.   2.   New moderate volume right pleural effusion.   3.    Large area of mildly enhancing consolidation in the right lower lobe   and part of right middle lobe with internal arteriograms.  This is highly   suggestive of pneumonia.  Minimal dependent atelectasis left lung base.   4.   Stable mediastinal lymphadenopathy.   5.   Reflux of contrast into the IVC and hepatic veins, which can be seen   in the setting of right heart dysfunction.      Dictated by: ASIF MILLER   Dictated on: 9/3/2022 5:06 PM       SUDHAKAR AGUILA M.D., have reviewed the images and report and concur with   these findings interpreted by ASIF MILLER.      Electronically Signed by: SUDHAKAR GUNTER M.D.    Signed on: 9/3/2022 5:41 PM          XR CHEST PA OR AP 1 VIEW   Final Result   FINDINGS AND IMPRESSION:      Endotracheal tube with tip projected 3 cm above the level the na.      Cysts right heart border is obscured.      Hazy and confluent opacities throughout the right lung are increased since   the prior exam suspicious for worsening layering pleural fluid with   associated atelectasis/consolidation.  No detectable pneumothorax.      Continued follow-up is recommended.      Electronically Signed by: MARSHALL ENCISO M.D.    Signed on: 9/2/2022 7:44 PM          Endoscopic Bronchial Ultrasound    (Results Pending)       Current Facility-Administered Medications   Medication Dose Route Frequency Provider Last Rate Last Admin   • ipratropium-albuterol (DUONEB) 0.5-2.5 (3) MG/3ML nebulizer solution 3 mL  3 mL Nebulization 4x Daily Resp Orin Rae MD   3 mL at 09/04/22 1321   • cefepime  (MAXIPIME) 1,000 mg in sodium chloride 0.9 % 100 mL IVPB  1,000 mg Intravenous 3 times per day Keyon Malik  mL/hr at 09/04/22 1108 1,000 mg at 09/04/22 1108   • VANCOMYCIN - PHARMACIST MONITORED Misc   Does not apply See Admin Instructions Keyon Malik MD       • vancomycin (VANCOCIN) 750 mg in sodium chloride 0.9 % 250 mL IVPB  750 mg Intravenous 2 times per day Keyon Malik .7 mL/hr at 09/04/22 0930 750 mg at 09/04/22 0930   • pantoprazole (PROTONIX) EC tablet 40 mg  40 mg Oral QAM AC Keyon Malik MD   40 mg at 09/04/22 0545   • lactated ringers bolus 1,000 mL  1,000 mL Intravenous Once Deena Barrera MD        Followed by   • lactated ringers bolus 1,000 mL  1,000 mL Intravenous Once Deena Barrera MD       • atorvastatin (LIPITOR) tablet 20 mg  20 mg Oral Nightly Deena Barrera MD   20 mg at 09/03/22 2110   • montelukast (SINGULAIR) tablet 10 mg  10 mg Oral Q Evening Deena Barrera MD   10 mg at 09/03/22 1724   • gabapentin (NEURONTIN) capsule 100 mg  100 mg Oral Daily Deena Barrera MD   100 mg at 09/04/22 0800   • baclofen (LIORESAL) tablet 10 mg  10 mg Oral Nightly Deena Barrera MD   10 mg at 09/03/22 2110   • ketotifen (ZADITOR) 0.025 % ophthalmic solution 1 drop  1 drop Both Eyes BID Deena Barrera MD   1 drop at 09/04/22 0800   • [Held by provider] lisinopril-hydroCHLOROthiazide 10-12.5 mg   Oral Daily Deena Barrera MD       • fluticasone-vilanterol (BREO ELLIPTA) 200-25 MCG/INH inhaler 1 puff  1 puff Inhalation Daily Resp Deena Barrera MD   1 puff at 09/04/22 0949   • sodium chloride (PF) 0.9 % injection 2 mL  2 mL Intracatheter 2 times per day Deena Barrera MD   2 mL at 09/04/22 0800   • enoxaparin (LOVENOX) injection 40 mg  40 mg Subcutaneous Daily Deena Barrera MD   40 mg at 09/04/22 0800      Current Facility-Administered Medications   Medication Dose Route Frequency  Provider Last Rate Last Admin   • acetaminophen (TYLENOL) tablet 650 mg  650 mg Oral Q4H PRN Keyon Malik MD   650 mg at 09/03/22 1032   • diphenhydrAMINE (BENADRYL) capsule 25 mg  25 mg Oral Q4H PRN José Martinez MD   25 mg at 09/03/22 2249   • albuterol inhaler 2 puff  2 puff Inhalation Q4H PRN Deena Barrera MD       • guaiFENesin-DM (ROBITUSSIN DM) 100-10 MG/5ML syrup 10 mL  10 mL Oral Q4H PRN Deena Barrera MD   10 mL at 09/04/22 0622   • sodium chloride 0.9 % flush bag 25 mL  25 mL Intravenous PRN Deena Barrera MD       • sodium chloride 0.9% infusion   Intravenous Continuous PRN Deena Barrera MD       • sodium chloride 0.9% infusion   Intravenous Continuous PRN Deena Barrera MD       • benzonatate (TESSALON PERLES) capsule 100 mg  100 mg Oral TID PRN Deena Barrera MD   100 mg at 09/04/22 1003        Assessment and Plan:  71 year old female presenting  with a past medical history significant for hypertensive heart disease, dyslipidemia, asthma who was admitted to the with worsening shortness of breath cough and wheezing    #Acute hypoxic respiratory failure  #Asthma exacerbation  #Right lobe pneumonia  -Status post bronchoscopy on 9/2/2022, pathology pending  -Patient was intubated post bronchoscopy however was subsequently extubated in the ICU, and transferred to telemetry floor  -Patient initiated on IV cefepime and vancomycin, continue duo nebs  -WBC 13.8> currently trending down within normal limits  -Pulmonary on board appreciate recommendations  -Currently on 5 L nasal cannula wean as tolerated    #Hypertension   -She is on lisinopril and hydrochlorothiazide at home  -Currently on hold.  Patient normotensive    #Dyslipidemia  -On Lipitor      FEN: Cardiac Diet  Nutrition Communication     Prophylaxis:   Current Active Medications for DVT Prophylaxis (From admission, onward)         Stop     enoxaparin (LOVENOX) injection 40 mg  40 mg,    Subcutaneous,   DAILY         --                    Prior     PCP: Marisol Arredondo MD

## 2022-09-29 NOTE — ED ADULT NURSE NOTE - CAS DISCH BELONGINGS RETURNED
How Severe Are Your Spot(S)?: mild What Is The Reason For Today's Visit?: Full Body Skin Examination What Is The Reason For Today's Visit? (Being Monitored For X): concerning skin lesions on an annual basis Additional History: Patient is in office for full body skin check. Patient last full body skin check is 2003. Patient wears sun block every other day, she uses [facility]. Patient states her in her mothers side has Hx of non melanoma cancer. She has oily skin and would like recommendations. Not applicable

## 2022-10-13 NOTE — ED ADULT TRIAGE NOTE - SPO2 (%)
99 Chonodrocutaneous Helical Advancement Flap Text: The defect edges were debeveled with a #15 scalpel blade.  Given the location of the defect and the proximity to free margins a chondrocutaneous helical advancement flap was deemed most appropriate.  Using a sterile surgical marker, the appropriate advancement flap was drawn incorporating the defect and placing the expected incisions within the relaxed skin tension lines where possible.    The area thus outlined was incised deep to adipose tissue with a #15 scalpel blade.  The skin margins were undermined to an appropriate distance in all directions utilizing iris scissors.

## 2023-04-05 ENCOUNTER — APPOINTMENT (OUTPATIENT)
Dept: NEUROSURGERY | Facility: CLINIC | Age: 68
End: 2023-04-05
Payer: MEDICARE

## 2023-04-05 VITALS
HEART RATE: 79 BPM | TEMPERATURE: 97.9 F | HEIGHT: 63 IN | OXYGEN SATURATION: 97 % | WEIGHT: 139 LBS | SYSTOLIC BLOOD PRESSURE: 111 MMHG | DIASTOLIC BLOOD PRESSURE: 65 MMHG | BODY MASS INDEX: 24.63 KG/M2

## 2023-04-05 PROCEDURE — 99213 OFFICE O/P EST LOW 20 MIN: CPT

## 2023-04-05 NOTE — REASON FOR VISIT
[Follow-Up: _____] : a [unfilled] follow-up visit [FreeTextEntry1] : Reviewed results of MRA head done at [facility] on 3/21/23

## 2023-04-05 NOTE — ASSESSMENT
[FreeTextEntry1] : IMPRESSION:\par 67F with PMH of sarcoidosis, narrow angle glaucoma, breast CA in remission, p/w L temporal artery "bulging" 5/20/22, concern for giant cell arteritis. Followed up outpatient with rheumatologist and neurologist, not deemed to have giant cell arteritis after evaluation. 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 Denies known family history of aneurysms.\par \par Patient continues to do clinically well, reports chronic mild occasional headaches. Denies other motor, sensory, speech, or visual abnormalities. \par \par MRA head w/wo IV contrast 3/21/23 at  shows no definite aneurysm, possible 2mm infundibulum vs. aneurysm of the left PCOMM visualized on prior imaging is not appreciated on the current study.\par \par Counseled patient on the natural history of aneurysms, discussed risk of true aneurysm rupture and risk factors for aneurysm rupture with the patient. These risk factors include extensive family history of cerebral aneurysms, which she denies, smoking which she states she has never been a smoker, and uncontrolled high blood pressure which she states her BP has been managed well. The risk of aneurysm rupture is also related to the size. Due to the small size of her possible aneurysm, it has very low risk of rupturing. Whether it is a true small aneurysm, or infundibulum, management remains the same\par \par Recommend conservative management with serial scans. If growth seen on interval scans, discussed possible aneurysm treatment options including minimally invasive endovascular intervention with embolization, or surgically open clipping of aneurysm. \par \par Educated the patient on signs and symptoms of aneurysm rupture or subarachnoid hemorrhage. Should she have severe, sudden onset worst headache of her life, advised that she must seek medical attention immediately and go to the emergency room if this occurs. ]]\par \par \par \par \par PLAN:\par Repeat MRA brain non con in 1 year - March 2024\par Follow up after to discuss results\par

## 2023-04-05 NOTE — HISTORY OF PRESENT ILLNESS
[FreeTextEntry1] : JORGE CHEN is a 67 year old female with PMH of sarcoidosis, narrow angle glaucoma, breast cancer in remission, presented to [facility] ED on 5/20/22 with reports of left temporal artery "bulging" with no associated pain or headache at the time. She states she just had visually noticed a pulsating, bulging blood vessel in her left temple. Of note, she was positive for the Flu at time of admission. Per patient, she was never diagnosed with HTN but her PCP, Dr. Shalonda Key prescribed her Norvasc 2.5mg which she irregularly takes every once in a while. Denies known family history of neurologic problems or aneurysms. Upon discharged from ED she was instructed to follow up with rheumatologist and neurologist. She was evaluated and was told she does not have giant cell arteritis. 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 She has mild occasional headaches that have been chronic and are not bothersome for her. \par \par Today, patient doing well with no new complaints. Denies other neurologic symptoms of motor, sensory, speech, or visual abnormalities.

## 2023-04-05 NOTE — PHYSICAL EXAM
[Person] : oriented to person [Place] : oriented to place [Time] : oriented to time [Cranial Nerves Optic (II)] : visual acuity intact bilaterally,  pupils equal round and reactive to light [Cranial Nerves Oculomotor (III)] : extraocular motion intact [Cranial Nerves Trigeminal (V)] : facial sensation intact symmetrically [Cranial Nerves Facial (VII)] : face symmetrical [Cranial Nerves Vestibulocochlear (VIII)] : hearing was intact bilaterally [Cranial Nerves Glossopharyngeal (IX)] : tongue and palate midline [Cranial Nerves Hypoglossal (XII)] : there was no tongue deviation with protrusion [Cranial Nerves Accessory (XI - Cranial And Spinal)] : head turning and shoulder shrug symmetric [Motor Tone] : muscle tone was normal in all four extremities [Motor Strength] : muscle strength was normal in all four extremities [Balance] : balance was intact [Abnormal Walk] : normal gait

## 2023-08-04 NOTE — DISCHARGE NOTE PROVIDER - NS AS DC PROVIDER CONTACT Y/N MULTI
Spoke to patient about mestinon and Cellcept. Lower extremity fatigue continues. Recommend to start Vyvgart, 4 weeks on, 5 weeks off. Patient is not yet Medicare, but reports at some point next will be. Not sure if that changes things. MGADL score is 5  Has been on prednisone (GI bleed), IVIG side effects, and Cellcept      Also instructed patient to get LFT's/CMP redrawn. Myasthenia Gravis Activities of Daily Living (MG-ADL)  This questionnaire will assess the patient's symptoms and their impact on daily-living function.        None=0 Mild=1 Moderate=2 Severe=3 Score   Talking Normal          [] Intermittent slurring or nasal speech      [x] Constant slurring or nasal speech, but can be understood  [] [id number] speech        [] 1   Chewing Normal    [x] Fatigue with solid food  [] Fatigue with soft food  [] Gastric tube    [] 0   Swallowing Normal          [x] Rare episode of choking        [] Frequent choking necessitating changes in diet  [] Gastric tube          [] 0   Breathing Normal      [] Shortness of breath with exertion  [x] Shortness of breath at rest    [] Ventilator dependence      [] 1   Impairment of ability to brush teeth or comb hair Normal        [x] Extra effort, but no rest period needed  [] Rest period needed      [] Cannot do one of these functions      [] 0   Impairment of ability to arise from a chair Normal      [] Mild, sometimes uses arms  [] Moderate, always uses arms  [x] Severe, requires assistance    [] 2   Double vision Normal    [x] Occurs, but not daily  [] Daily, but no constant  [] Constant    [] 0   Eyelid droop Normal    [] Occurs, but not daily  [x] Daily, but not constant  [] Constant    [] 1   TOTAL SCORE     5 out of 24 Yes

## 2023-08-30 ENCOUNTER — APPOINTMENT (OUTPATIENT)
Dept: NEUROSURGERY | Facility: CLINIC | Age: 68
End: 2023-08-30

## 2024-02-23 ENCOUNTER — APPOINTMENT (OUTPATIENT)
Dept: VASCULAR SURGERY | Facility: CLINIC | Age: 69
End: 2024-02-23
Payer: MEDICARE

## 2024-02-23 VITALS
WEIGHT: 140 LBS | SYSTOLIC BLOOD PRESSURE: 110 MMHG | HEIGHT: 63 IN | TEMPERATURE: 98.2 F | BODY MASS INDEX: 24.8 KG/M2 | DIASTOLIC BLOOD PRESSURE: 71 MMHG | HEART RATE: 71 BPM

## 2024-02-23 PROCEDURE — 99203 OFFICE O/P NEW LOW 30 MIN: CPT

## 2024-02-23 PROCEDURE — 93880 EXTRACRANIAL BILAT STUDY: CPT

## 2024-04-02 ENCOUNTER — NON-APPOINTMENT (OUTPATIENT)
Age: 69
End: 2024-04-02

## 2024-04-03 ENCOUNTER — APPOINTMENT (OUTPATIENT)
Dept: NEUROSURGERY | Facility: CLINIC | Age: 69
End: 2024-04-03
Payer: MEDICARE

## 2024-04-03 VITALS
HEART RATE: 67 BPM | SYSTOLIC BLOOD PRESSURE: 122 MMHG | HEIGHT: 63 IN | OXYGEN SATURATION: 97 % | BODY MASS INDEX: 24.8 KG/M2 | WEIGHT: 140 LBS | DIASTOLIC BLOOD PRESSURE: 77 MMHG

## 2024-04-03 DIAGNOSIS — Z86.79 PERSONAL HISTORY OF OTHER DISEASES OF THE CIRCULATORY SYSTEM: ICD-10-CM

## 2024-04-03 DIAGNOSIS — I67.1 CEREBRAL ANEURYSM, NONRUPTURED: ICD-10-CM

## 2024-04-03 DIAGNOSIS — Z86.69 PERSONAL HISTORY OF OTHER DISEASES OF THE NERVOUS SYSTEM AND SENSE ORGANS: ICD-10-CM

## 2024-04-03 DIAGNOSIS — Z78.9 OTHER SPECIFIED HEALTH STATUS: ICD-10-CM

## 2024-04-03 PROCEDURE — 99214 OFFICE O/P EST MOD 30 MIN: CPT

## 2024-04-03 NOTE — HISTORY OF PRESENT ILLNESS
[FreeTextEntry1] : JORGE CHEN is a 68 year old female with PMH of sarcoidosis, narrow angle glaucoma, breast cancer in remission, presented to [facility] ED on 5/20/22 with reports of left temporal artery "bulging" with no associated pain or headache at the time. She states she just had visually noticed a pulsating, bulging blood vessel in her left temple. Of note, she was positive for the Flu at time of admission. Per patient, she was never diagnosed with HTN but her PCP, Dr. Shalonda Key prescribed her Norvasc 2.5mg which she irregularly takes every once in a while. Denies known family history of neurologic problems or aneurysms. Upon discharged from ED she was instructed to follow up with rheumatologist and neurologist. She was evaluated and was told she does not have giant cell arteritis. 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 She has mild occasional headaches that have been chronic and are not bothersome for her.  Today, she presents for annual follow up to review results of repeat MRA brain done at [facility] on 3/29/24. She reports a tinnitus, humming sound in her left ear that started in December 2023 and underwent MRI brain and IACs 1/24/24 at  as well ordered by ENT Dr. Lucius Robles.

## 2024-04-03 NOTE — ASSESSMENT
[FreeTextEntry1] : IMPRESSION: 68F with PMH of sarcoidosis, narrow angle glaucoma, breast CA in remission, p/w L temporal artery "bulging" 5/20/22, concern for giant cell arteritis. Followed up outpatient with rheumatologist and neurologist, not deemed to have giant cell arteritis after evaluation. Underwent imaging with MRI 6/24/22 unremarkable, MRA 6/10/22 done at 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 Denies known family history of aneurysms.  [facility] imaging - Under [facility] Annual MRA brain 3/29/24 at  shows stable 1-2mm left PCOMM aneurysm vs. infundibulum compared to prior MRA from 3/21/23 at . Due to the small size of her possible aneurysm, it has very low risk of rupturing. Whether it is a true small aneurysm, or infundibulum, management remains the same. Recommend continued conservative management with serial scans. If substantial growth seen on interval scans, discussed possible aneurysm treatment options including minimally invasive endovascular intervention with embolization, or surgically open clipping of aneurysm. Educated the patient on signs and symptoms of aneurysm rupture or subarachnoid hemorrhage. Should she have severe, sudden onset worst headache of her life, advised that she must seek medical attention immediately and go to the emergency room if this occurs.   Patient continues to do clinically well. She reports mild occasional headaches that are not bothersome. Regarding her occasional left sided tinnitus and "humming sound," the MRI brain/IACs obtained 1/24/24 at  is unremarkable. No neurovascular causes seen on MRI.     PLAN: Repeat MRA brain non con in 1 year - March 2025 Follow up after to discuss results If stable, will space our imaging to every 2 years after that Continue follow up with ENT for further work-up of tinnitus

## 2024-04-03 NOTE — REASON FOR VISIT
[Follow-Up: _____] : a [unfilled] follow-up visit [FreeTextEntry1] : Reviewed results of MRA head non con done 3/29/24 at [facility]

## 2024-04-03 NOTE — DATA REVIEWED
[de-identified] : MRI brain and IACs w/wo IV contrast 1/24/24 - [facility] [de-identified] : MRA head non con done 3/29/24 - [facility]

## 2024-09-05 ENCOUNTER — APPOINTMENT (OUTPATIENT)
Dept: NEPHROLOGY | Facility: CLINIC | Age: 69
End: 2024-09-05
Payer: MEDICARE

## 2024-09-05 VITALS
OXYGEN SATURATION: 99 % | WEIGHT: 149 LBS | HEIGHT: 63 IN | TEMPERATURE: 97.6 F | BODY MASS INDEX: 26.4 KG/M2 | SYSTOLIC BLOOD PRESSURE: 119 MMHG | DIASTOLIC BLOOD PRESSURE: 74 MMHG | HEART RATE: 70 BPM

## 2024-09-05 DIAGNOSIS — N18.2 CHRONIC KIDNEY DISEASE, STAGE 2 (MILD): ICD-10-CM

## 2024-09-05 PROCEDURE — G2211 COMPLEX E/M VISIT ADD ON: CPT

## 2024-09-05 PROCEDURE — 99204 OFFICE O/P NEW MOD 45 MIN: CPT

## 2024-09-05 NOTE — HISTORY OF PRESENT ILLNESS
[FreeTextEntry1] : 68 yo woman referred for elevated creatinine Has been healthy ? of temporal arteritis, was negative +sarcoid Mild Htn Has been feeling well, noted to have some fluctuation of serum creatinine at the upper limits of normal No NSAIDS, other drugs

## 2024-09-05 NOTE — ASSESSMENT
[FreeTextEntry1] : 70 yo woman referred for elevated creatinine Has been healthy ? of temporal arteritis, was negative +sarcoid Mild Htn Has been feeling well, noted to have some fluctuation of serum creatinine at the upper limits of normal No NSAIDS, other drugs ----- ?CKD   Unclear whether any renal problem   SCr had been in the 1 - 1.2 range     Slight decrease in eGFR   Very little in the way of test results available   Will do blood and urine tests today  The total time of preparation for this visit, the visit itself and writing the note was 44 minutes

## 2024-09-05 NOTE — PHYSICAL EXAM
[General Appearance - In No Acute Distress] : in no acute distress [General Appearance - Alert] : alert [Sclera] : the sclera and conjunctiva were normal [PERRL With Normal Accommodation] : pupils were equal in size, round, and reactive to light [Extraocular Movements] : extraocular movements were intact [Outer Ear] : the ears and nose were normal in appearance [Oropharynx] : the oropharynx was normal [Neck Cervical Mass (___cm)] : no neck mass was observed [Neck Appearance] : the appearance of the neck was normal [Jugular Venous Distention Increased] : there was no jugular-venous distention [Thyroid Diffuse Enlargement] : the thyroid was not enlarged [Thyroid Nodule] : there were no palpable thyroid nodules [Auscultation Breath Sounds / Voice Sounds] : lungs were clear to auscultation bilaterally [Heart Rate And Rhythm] : heart rate was normal and rhythm regular [Heart Sounds] : normal S1 and S2 [Heart Sounds Gallop] : no gallops [Murmurs] : no murmurs [Heart Sounds Pericardial Friction Rub] : no pericardial rub [Full Pulse] : the pedal pulses are present [Edema] : there was no peripheral edema [Bowel Sounds] : normal bowel sounds [Abdomen Soft] : soft [Abdomen Tenderness] : non-tender [] : no hepato-splenomegaly [Abdomen Mass (___ Cm)] : no abdominal mass palpated [No CVA Tenderness] : no ~M costovertebral angle tenderness [No Spinal Tenderness] : no spinal tenderness

## 2024-09-06 LAB
ALBUMIN SERPL ELPH-MCNC: 4.3 G/DL
ALP BLD-CCNC: 89 U/L
ALT SERPL-CCNC: 29 U/L
ANION GAP SERPL CALC-SCNC: 14 MMOL/L
APPEARANCE: ABNORMAL
AST SERPL-CCNC: 27 U/L
BACTERIA: ABNORMAL /HPF
BILIRUB SERPL-MCNC: 0.2 MG/DL
BILIRUBIN URINE: NEGATIVE
BLOOD URINE: NEGATIVE
BUN SERPL-MCNC: 13 MG/DL
CALCIUM SERPL-MCNC: 9.2 MG/DL
CAST: 0 /LPF
CHLORIDE SERPL-SCNC: 104 MMOL/L
CO2 SERPL-SCNC: 22 MMOL/L
COLOR: YELLOW
CREAT SERPL-MCNC: 1.12 MG/DL
CREAT SPEC-SCNC: 193 MG/DL
CREAT/PROT UR: 0.1 RATIO
EGFR: 54 ML/MIN/1.73M2
EPITHELIAL CELLS: 12 /HPF
GLUCOSE QUALITATIVE U: NEGATIVE MG/DL
GLUCOSE SERPL-MCNC: 90 MG/DL
HCT VFR BLD CALC: 41.7 %
HGB BLD-MCNC: 13 G/DL
KETONES URINE: NEGATIVE MG/DL
LEUKOCYTE ESTERASE URINE: NEGATIVE
MCHC RBC-ENTMCNC: 29 PG
MCHC RBC-ENTMCNC: 31.2 GM/DL
MCV RBC AUTO: 93.1 FL
MICROSCOPIC-UA: NORMAL
NITRITE URINE: NEGATIVE
PH URINE: 7
PLATELET # BLD AUTO: 306 K/UL
POTASSIUM SERPL-SCNC: 4.6 MMOL/L
PROT SERPL-MCNC: 7.2 G/DL
PROT UR-MCNC: 16 MG/DL
PROTEIN URINE: NORMAL MG/DL
RBC # BLD: 4.48 M/UL
RBC # FLD: 14.3 %
RED BLOOD CELLS URINE: 1 /HPF
SODIUM SERPL-SCNC: 140 MMOL/L
SPECIFIC GRAVITY URINE: 1.02
UROBILINOGEN URINE: 1 MG/DL
WBC # FLD AUTO: 7.38 K/UL
WHITE BLOOD CELLS URINE: 1 /HPF

## 2025-04-09 ENCOUNTER — APPOINTMENT (OUTPATIENT)
Dept: NEUROSURGERY | Facility: CLINIC | Age: 70
End: 2025-04-09
Payer: MEDICARE

## 2025-04-09 ENCOUNTER — NON-APPOINTMENT (OUTPATIENT)
Age: 70
End: 2025-04-09

## 2025-04-09 VITALS
BODY MASS INDEX: 26.22 KG/M2 | SYSTOLIC BLOOD PRESSURE: 154 MMHG | DIASTOLIC BLOOD PRESSURE: 80 MMHG | WEIGHT: 148 LBS | RESPIRATION RATE: 16 BRPM | HEIGHT: 63 IN | OXYGEN SATURATION: 97 % | HEART RATE: 86 BPM

## 2025-04-09 DIAGNOSIS — I67.1 CEREBRAL ANEURYSM, NONRUPTURED: ICD-10-CM

## 2025-04-09 PROCEDURE — 99214 OFFICE O/P EST MOD 30 MIN: CPT
